# Patient Record
Sex: FEMALE | Race: WHITE | NOT HISPANIC OR LATINO | Employment: OTHER | URBAN - METROPOLITAN AREA
[De-identification: names, ages, dates, MRNs, and addresses within clinical notes are randomized per-mention and may not be internally consistent; named-entity substitution may affect disease eponyms.]

---

## 2017-01-17 ENCOUNTER — TELEPHONE (OUTPATIENT)
Dept: CARDIOLOGY | Facility: CLINIC | Age: 82
End: 2017-01-17

## 2017-01-17 NOTE — TELEPHONE ENCOUNTER
----- Message from Meeta Malin MA sent at 1/17/2017  2:28 PM CST -----  Contact: Pt's Daughter (Nan)  Pt's daughter called this pm.  She was calling in reference to an appt that was rescheduled by Mrs. Richardson to 2/14/17 w/ Dr. Curran.  She normally sees Dr. Mon and her daughter would rather her see Dr. Mon and sooner rather than later.  Mrs. Richardson is also having issues with her mood.  Since her  is very sick and receiving hospice care she does not want to leave him to go to her cardiac rehab and other appt's.   The daughters say she does not take care of herself at all.  They want to know if there is anyway she can see someone in psychiatry for those issues.  Mrs. Montana can be reached at [phone] and another  is Meeta at [phone].  Thanks

## 2017-01-26 ENCOUNTER — TELEPHONE (OUTPATIENT)
Dept: CARDIOLOGY | Facility: CLINIC | Age: 82
End: 2017-01-26

## 2017-01-26 NOTE — TELEPHONE ENCOUNTER
Per 02/17/17 note patient family will talk to doctor on 02/03/17 about rehab she do not want to attend.

## 2017-02-03 ENCOUNTER — OFFICE VISIT (OUTPATIENT)
Dept: CARDIOLOGY | Facility: CLINIC | Age: 82
End: 2017-02-03
Payer: MEDICARE

## 2017-02-03 VITALS
HEIGHT: 63 IN | DIASTOLIC BLOOD PRESSURE: 65 MMHG | BODY MASS INDEX: 33.01 KG/M2 | SYSTOLIC BLOOD PRESSURE: 145 MMHG | WEIGHT: 186.31 LBS | HEART RATE: 58 BPM

## 2017-02-03 DIAGNOSIS — E78.5 HYPERLIPIDEMIA, UNSPECIFIED HYPERLIPIDEMIA TYPE: ICD-10-CM

## 2017-02-03 DIAGNOSIS — I50.31 ACUTE DIASTOLIC HEART FAILURE: ICD-10-CM

## 2017-02-03 DIAGNOSIS — I25.10 CORONARY ARTERY DISEASE INVOLVING NATIVE CORONARY ARTERY OF NATIVE HEART WITHOUT ANGINA PECTORIS: ICD-10-CM

## 2017-02-03 DIAGNOSIS — I10 ESSENTIAL HYPERTENSION: ICD-10-CM

## 2017-02-03 DIAGNOSIS — I83.92 VARICOSE VEINS OF LEFT LOWER EXTREMITY: ICD-10-CM

## 2017-02-03 DIAGNOSIS — I21.4 NSTEMI (NON-ST ELEVATED MYOCARDIAL INFARCTION): ICD-10-CM

## 2017-02-03 DIAGNOSIS — I83.893 VARICOSE VEINS OF LEG WITH EDEMA, BILATERAL: ICD-10-CM

## 2017-02-03 DIAGNOSIS — I50.31 ACUTE DIASTOLIC CHF (CONGESTIVE HEART FAILURE), NYHA CLASS 2: Primary | ICD-10-CM

## 2017-02-03 PROCEDURE — 1157F ADVNC CARE PLAN IN RCRD: CPT | Mod: GC,S$GLB,, | Performed by: INTERNAL MEDICINE

## 2017-02-03 PROCEDURE — 1159F MED LIST DOCD IN RCRD: CPT | Mod: GC,S$GLB,, | Performed by: INTERNAL MEDICINE

## 2017-02-03 PROCEDURE — 99999 PR PBB SHADOW E&M-EST. PATIENT-LVL IV: CPT | Mod: PBBFAC,GC,, | Performed by: INTERNAL MEDICINE

## 2017-02-03 PROCEDURE — 1160F RVW MEDS BY RX/DR IN RCRD: CPT | Mod: GC,S$GLB,, | Performed by: INTERNAL MEDICINE

## 2017-02-03 PROCEDURE — 1126F AMNT PAIN NOTED NONE PRSNT: CPT | Mod: GC,S$GLB,, | Performed by: INTERNAL MEDICINE

## 2017-02-03 PROCEDURE — 99214 OFFICE O/P EST MOD 30 MIN: CPT | Mod: GC,S$GLB,, | Performed by: INTERNAL MEDICINE

## 2017-02-03 RX ORDER — FUROSEMIDE 20 MG/1
20 TABLET ORAL DAILY
Qty: 30 TABLET | Refills: 11 | Status: SHIPPED | OUTPATIENT
Start: 2017-02-03 | End: 2017-04-07

## 2017-02-03 NOTE — PROGRESS NOTES
Cardiology Clinic Note  Reason for Visit: NSTEMI      HPI:   89 y.o. female with h/o HTN, HLP, chronic venous insufficiency, MGUS, Hypothyroidism, Anxiety who present for f/u of NSTEMI s/p PCI with THONY to ostial PDA and POBA to PLB to prevent jailing from stent struts on 9/23/16. NL EF with diastolic dysfunction at that time Pt denies any recurrent pain to BL chest or back pain which brought her to hospital. She stopped cardiac rehab 2/2 fatigue from exercising in her treadmill. She does get SOB with exertion(walkign 2 blocks and sweeping). No orthopnea, PND or LE edema. Has gaiend 6lbs since Oct. No SOB or CURRY. SBP at home in 130's. No dizziness. No bleeding issues. She does have pain in BL LE from varicose veins. Has had EVLT in past and would like another opinion as opposed Vasc Sx rec    ROS:    Constitution: Negative for fever or chills. Negative for weight loss or gain.   HENT: Negative for sore throat or headaches. Negative for rhinorrhea.  Eyes: Negative for blurred or double vision.   Cardiovascular: See above  Pulmonary: Negative for SOB. Negative for cough.   Gastrointestinal: Negative for abdominal pain. Negative for nausea/ vomiting. Negative for diarrhea.   : Negative for dysuria.   Neurological: Negative for focal weakness or sensory changes.  PMH:     Past Medical History   Diagnosis Date    Anticoagulant long-term use     Anxiety     Arthritis     Chronic rhinitis     Coronary artery disease involving native coronary artery of native heart without angina pectoris 10/14/2016    Encounter for blood transfusion     Glucose intolerance (impaired glucose tolerance) 2/25/2013    Hyperlipidemia     Hypertension     Osteoporosis     Polyclonal gammopathy     Thyroid disease      Past Surgical History   Procedure Laterality Date    Appendectomy      Hysterectomy      Eye surgery      Tonsillectomy       Allergies:   Review of patient's allergies indicates:  No Known  Allergies  Medications:     Current Outpatient Prescriptions on File Prior to Visit   Medication Sig Dispense Refill    acetaminophen (TYLENOL) 325 MG tablet Take 325 mg by mouth every 6 (six) hours as needed.        alprazolam (XANAX) 0.25 MG tablet TAKE 1 TABLET(0.25 MG) BY MOUTH EVERY NIGHT AS NEEDED FOR ANXIETY 30 tablet 0    aspirin (ECOTRIN) 81 MG EC tablet Take 1 tablet (81 mg total) by mouth once daily.  0    atorvastatin (LIPITOR) 40 MG tablet Take 1 tablet (40 mg total) by mouth once daily. 90 tablet 3    cetirizine (ZYRTEC) 10 MG tablet Take 10 mg by mouth daily as needed.        clopidogrel (PLAVIX) 75 mg tablet Take 1 tablet (75 mg total) by mouth once daily. 30 tablet 11    cyanocobalamin (VITAMIN B-12) 1000 MCG tablet Take 100 mcg by mouth once daily.      inhalation device (AEROCHAMBER MV) Use as directed for inhalation. 1 Device 3    levothyroxine (SYNTHROID) 75 MCG tablet Take 1 tablet (75 mcg total) by mouth once daily. 90 tablet 3    lisinopril (PRINIVIL,ZESTRIL) 5 MG tablet Take 1 tablet (5 mg total) by mouth once daily. (Patient taking differently: Take 5 mg by mouth 2 (two) times daily. ) 30 tablet 11    metoprolol tartrate (LOPRESSOR) 25 MG tablet Take 1 tablet (25 mg total) by mouth 2 (two) times daily. 60 tablet 11    multivitamin (THERAGRAN) per tablet Take 1 tablet by mouth once daily.      NASACORT AQ 55 mcg nasal inhaler INHALE 1 TO 2 SPRAYS IN EACH NASAL EVERY DAY 16.5 g 12    nitroGLYCERIN (NITROSTAT) 0.4 MG SL tablet Place 1 tablet (0.4 mg total) under the tongue every 5 (five) minutes as needed for Chest pain. 30 tablet 3     No current facility-administered medications on file prior to visit.      Social History:     Social History   Substance Use Topics    Smoking status: Never Smoker    Smokeless tobacco: Never Used    Alcohol use No     Family History:     Family History   Problem Relation Age of Onset    Hypertension Mother     Hypertension Father      "Diabetes Father     Cancer Father     No Known Problems Daughter     No Known Problems Daughter     No Known Problems Daughter     Asthma Neg Hx     Emphysema Neg Hx      Physical Exam:     Visit Vitals    BP (!) 145/65 (BP Location: Left arm, Patient Position: Sitting, BP Method: Automatic)    Pulse (!) 58    Ht 5' 2.5" (1.588 m)    Wt 84.5 kg (186 lb 4.6 oz)    BMI 33.53 kg/m2        Constitutional: No acute distress, conversant  HEENT: Sclera anicteric, Pupils equal, round and reactive to light, extraocular motions intact, Oropharynx clear  Neck: No JVD,+HJR, no carotid bruits  Cardiovascular: regular rate and rhythm, no murmur, rubs or gallops, normal S1/S2  Pulmonary: Clear to auscultation bilaterally  Abdominal: Abdomen soft, nontender, nondistended, positive bowel sounds  Extremities: No lower extremity edema,   Pulses: 2+ BL Radial, 2+ BL carotid, 2+ BL DP, 2+ BL PT  Skin: No ecchymosis, erythema, or ulcers  Psych: Alert and oriented x 3, appropriate affect  Neuro: CNII-XII intact, no focal deficits    Labs:     Lab Results   Component Value Date     09/30/2016    K 4.5 09/30/2016     09/30/2016    CO2 22 (L) 09/30/2016    BUN 18 09/30/2016    CREATININE 0.8 09/30/2016    ANIONGAP 11 09/30/2016     Lab Results   Component Value Date    AST 37 09/25/2016    ALT 22 09/25/2016    ALKPHOS 59 09/25/2016    BILITOT 0.5 09/25/2016    BILIDIR 0.1 07/08/2014    ALBUMIN 3.1 (L) 09/25/2016     Lab Results   Component Value Date    CALCIUM 9.4 09/30/2016    MG 2.2 09/25/2016    PHOS 2.8 08/04/2010     Lab Results   Component Value Date     (H) 09/22/2016    BNP 53 05/06/2009    Lab Results   Component Value Date    WBC 8.23 11/01/2016    HGB 14.0 11/01/2016    HCT 42.0 11/01/2016     11/01/2016    GRAN 4.9 11/01/2016    GRAN 60.1 11/01/2016     Lab Results   Component Value Date    INR 1.2 09/22/2016     Lab Results   Component Value Date    CHOL 116 (L) 11/01/2016    HDL 43 " 11/01/2016    LDLCALC 55.4 (L) 11/01/2016    TRIG 88 11/01/2016     Lab Results   Component Value Date    HGBA1C 6.2 09/30/2014     Lab Results   Component Value Date    TSH 3.156 09/30/2014    K6JZTHN 7.6 03/09/2012          Imaging:       EF   Date Value Ref Range Status   09/23/2016 60 55 - 65        EKG: NSR  Assessment:     CAD s/p PCI with THONY to ostial PDA and POBA to PLB to prevent jailing from stent struts on 9/23/16  - Cont ASA, Plavix, Lipitor, lisinopril, and metoprolol  - BP and HR at goal  - EF normal 9/2016  - No Angina    Acute on Chronic Diastolic Heart Failure NYHA II  - BP at goal  - Will start Lasix 20mg QD to see if helps as does have +HJR but no JVD, orthopnea, or PND  - Check BNP and BMP in one week    Varicose Veins  - Hx of EVLT in past but would like 2/2 opinion as Vasx Sx informed her they would not rec any further procedures and pt still having significant pain despite compression stockings so will refer to Vasc Medicine.    HTN  - Cont above regemin  - Cr stable since starting ACE    HLD  - LDL at goal    Obesity  - Encouraged diet and exercise    RTC in 3 months    Signed:  Donnie Gardner MD  Cardiology Fellow  [phone]  2/3/2017 9:27 AM    Follow-up:   RTC in 3 months

## 2017-02-03 NOTE — MR AVS SNAPSHOT
[facility]  [address]  [facility] [address]  Phone: [phone]                  Elisa Richardson   2/3/2017 10:40 AM   Office Visit    Description:  Female : 1927   Provider:  Donnie Gardner MD   Department:  [facility]           Reason for Visit     Hypertension     Shortness of Breath           Diagnoses this Visit        Comments    Acute diastolic CHF (congestive heart failure), NYHA class 2    -  Primary     Varicose veins of left lower extremity         Hyperlipidemia, unspecified hyperlipidemia type         NSTEMI (non-ST elevated myocardial infarction)                To Do List           Goals (5 Years of Data)     None       These Medications        Disp Refills Start End    furosemide (LASIX) 20 MG tablet 30 tablet 11 2/3/2017 3/5/2017    Take 1 tablet (20 mg total) by mouth once daily. - Oral    [facility] [address] [place] [address] AT [facility] #: [phone]         [facility] On Call     [facility] On Call Nurse Care Line -  Assistance  Registered nurses in the [facility] On Call Center provide clinical advisement, health education, appointment booking, and other advisory services.  Call for this free service at [phone].             Medications           Message regarding Medications     Verify the changes and/or additions to your medication regime listed below are the same as discussed with your clinician today.  If any of these changes or additions are incorrect, please notify your healthcare provider.        START taking these NEW medications        Refills    furosemide (LASIX) 20 MG tablet 11    Sig: Take 1 tablet (20 mg total) by mouth once daily.    Class: Normal    Route: Oral           Verify that the below list of medications is an accurate representation of the medications you are currently taking.  If none reported, the list may be blank. If incorrect, please contact your  "healthcare provider. Carry this list with you in case of emergency.           Current Medications     acetaminophen (TYLENOL) 325 MG tablet Take 325 mg by mouth every 6 (six) hours as needed.      alprazolam (XANAX) 0.25 MG tablet TAKE 1 TABLET(0.25 MG) BY MOUTH EVERY NIGHT AS NEEDED FOR ANXIETY    aspirin (ECOTRIN) 81 MG EC tablet Take 1 tablet (81 mg total) by mouth once daily.    atorvastatin (LIPITOR) 40 MG tablet Take 1 tablet (40 mg total) by mouth once daily.    cetirizine (ZYRTEC) 10 MG tablet Take 10 mg by mouth daily as needed.      clopidogrel (PLAVIX) 75 mg tablet Take 1 tablet (75 mg total) by mouth once daily.    cyanocobalamin (VITAMIN B-12) 1000 MCG tablet Take 100 mcg by mouth once daily.    furosemide (LASIX) 20 MG tablet Take 1 tablet (20 mg total) by mouth once daily.    inhalation device (AEROCHAMBER MV) Use as directed for inhalation.    levothyroxine (SYNTHROID) 75 MCG tablet Take 1 tablet (75 mcg total) by mouth once daily.    lisinopril (PRINIVIL,ZESTRIL) 5 MG tablet Take 1 tablet (5 mg total) by mouth once daily.    metoprolol tartrate (LOPRESSOR) 25 MG tablet Take 1 tablet (25 mg total) by mouth 2 (two) times daily.    multivitamin (THERAGRAN) per tablet Take 1 tablet by mouth once daily.    NASACORT AQ 55 mcg nasal inhaler INHALE 1 TO 2 SPRAYS IN EACH NASAL EVERY DAY    nitroGLYCERIN (NITROSTAT) 0.4 MG SL tablet Place 1 tablet (0.4 mg total) under the tongue every 5 (five) minutes as needed for Chest pain.           Clinical Reference Information           Your Vitals Were     BP Pulse Height Weight BMI    145/65 (BP Location: Left arm, Patient Position: Sitting, BP Method: Automatic) 58 5' 2.5" (1.588 m) 84.5 kg (186 lb 4.6 oz) 33.53 kg/m2      Blood Pressure          Most Recent Value    Right Arm BP - Sitting  147/64    Left Arm BP - Sitting  145/65    BP  (!)  145/65      Allergies as of 2/3/2017     No Known Allergies      Immunizations Administered on Date of Encounter - 2/3/2017  "    None      Orders Placed During Today's Visit      Normal Orders This Visit    Ambulatory Referral to Vascular Medicine     Future Labs/Procedures Expected by Expires    Basic metabolic panel  2/3/2017 4/4/2018    Brain natriuretic peptide  2/3/2017 (Approximate) 2/3/2018      Language Assistance Services     ATTENTION: Language assistance services are available, free of charge. Please call [phone].      ATENCIÓN: Si habla español, tiene a gunter disposición servicios gratuitos de asistencia lingüística. Llame al [phone].     CHÚ Ý: N?u b?n nói Ti?ng Vi?t, có các d?ch v? h? tr? ngôn ng? mi?n phí dành cho b?n. G?i s? [phone].         [facility] complies with applicable Federal civil rights laws and does not discriminate on the basis of race, color, national origin, age, disability, or sex.

## 2017-02-13 RX ORDER — ALPRAZOLAM 0.25 MG/1
TABLET ORAL
Qty: 30 TABLET | Refills: 0 | Status: SHIPPED | OUTPATIENT
Start: 2017-02-13 | End: 2017-03-23 | Stop reason: SDUPTHER

## 2017-02-13 NOTE — TELEPHONE ENCOUNTER
----- Message from Esperanza Hernandez sent at 2/13/2017  2:41 PM CST -----  Contact: Self/[phone]  RX request - refill or new RX.  Is this a refill or new RX:  Refill  RX name and strength: alprazolam (XANAX) 0.25 MG tablet  Directions: TAKE 1 TABLET(0.25 MG) BY MOUTH EVERY NIGHT AS NEEDED FOR ANXIETY  Is this a 30 day or 90 day RX:    Pharmacy name and phone #: [facility] [id number]  [place], [place]     [phone]   Comments:  Please call and advise        Thank you

## 2017-02-21 RX ORDER — LISINOPRIL 5 MG/1
5 TABLET ORAL 2 TIMES DAILY
Qty: 180 TABLET | Refills: 3 | Status: SHIPPED | OUTPATIENT
Start: 2017-02-21 | End: 2018-03-05 | Stop reason: SDUPTHER

## 2017-03-23 ENCOUNTER — TELEPHONE (OUTPATIENT)
Dept: INTERNAL MEDICINE | Facility: CLINIC | Age: 82
End: 2017-03-23

## 2017-03-24 RX ORDER — ALPRAZOLAM 0.25 MG/1
TABLET ORAL
Qty: 30 TABLET | Refills: 0 | Status: SHIPPED | OUTPATIENT
Start: 2017-03-24 | End: 2017-06-12 | Stop reason: SDUPTHER

## 2017-04-03 ENCOUNTER — TELEPHONE (OUTPATIENT)
Dept: INTERNAL MEDICINE | Facility: CLINIC | Age: 82
End: 2017-04-03

## 2017-04-03 NOTE — TELEPHONE ENCOUNTER
[facility], the patient's  is being buried today, the patient has a h/o heart attack from last fall, /67, last week her systolic was 170, with a 'true' anxiety attack, she has Xanax ordered but takes it only at night because she is sensitive to it. C/o to her right scapula from last night, patient is asking for a new Cardiology Referral, did not want to see a 'fellow' again, [facility] concerned she is not sharing how much pain the CP is causing her or how often she is having it.  Dr Jalloh asked to speak to the [facility] and she did.

## 2017-04-03 NOTE — TELEPHONE ENCOUNTER
I spoke with granddaughter Consuelo.    Lots of stress-   last week  The patient had some right-sided back discomfort yesterday and took Motrin and symptoms alleviated.  No syncope or left-sided chest pain.  No jaw pain.  Declines appointment today    I recommended she go to the ER after her 's  if she has continued symptoms.  Otherwise, we will see her tomorrow.  We can arrange for cardiology assessment.

## 2017-04-03 NOTE — TELEPHONE ENCOUNTER
----- Message from Jeimy Morris sent at 4/3/2017  6:56 AM CDT -----  Contact: granddaughter-lindesy  Pt needs to see the dr today pt have chest pain , grand daughter said py do not want to talk to triage at all only wants to se dr ferguson please call [phone]

## 2017-04-04 ENCOUNTER — CLINICAL SUPPORT (OUTPATIENT)
Dept: CARDIOLOGY | Facility: CLINIC | Age: 82
End: 2017-04-04
Payer: MEDICARE

## 2017-04-04 ENCOUNTER — LAB VISIT (OUTPATIENT)
Dept: LAB | Facility: HOSPITAL | Age: 82
End: 2017-04-04
Attending: INTERNAL MEDICINE
Payer: MEDICARE

## 2017-04-04 ENCOUNTER — TELEPHONE (OUTPATIENT)
Dept: UROGYNECOLOGY | Facility: CLINIC | Age: 82
End: 2017-04-04

## 2017-04-04 ENCOUNTER — OFFICE VISIT (OUTPATIENT)
Dept: INTERNAL MEDICINE | Facility: CLINIC | Age: 82
End: 2017-04-04
Payer: MEDICARE

## 2017-04-04 VITALS — SYSTOLIC BLOOD PRESSURE: 145 MMHG | BODY MASS INDEX: 36 KG/M2 | DIASTOLIC BLOOD PRESSURE: 62 MMHG | WEIGHT: 200 LBS

## 2017-04-04 DIAGNOSIS — I77.9 BILATERAL CAROTID ARTERY DISEASE: ICD-10-CM

## 2017-04-04 DIAGNOSIS — E03.9 ACQUIRED HYPOTHYROIDISM: ICD-10-CM

## 2017-04-04 DIAGNOSIS — I83.11 VARICOSE VEINS OF BOTH LOWER EXTREMITIES WITH INFLAMMATION: ICD-10-CM

## 2017-04-04 DIAGNOSIS — F41.1 GENERALIZED ANXIETY DISORDER: ICD-10-CM

## 2017-04-04 DIAGNOSIS — E55.9 VITAMIN D DEFICIENCY DISEASE: ICD-10-CM

## 2017-04-04 DIAGNOSIS — N39.46 MIXED INCONTINENCE URGE AND STRESS: ICD-10-CM

## 2017-04-04 DIAGNOSIS — I83.893 VARICOSE VEINS OF BOTH LEGS WITH EDEMA: ICD-10-CM

## 2017-04-04 DIAGNOSIS — F43.21 GRIEF REACTION: ICD-10-CM

## 2017-04-04 DIAGNOSIS — I87.2 VENOUS INSUFFICIENCY (CHRONIC) (PERIPHERAL): ICD-10-CM

## 2017-04-04 DIAGNOSIS — D47.2 MGUS (MONOCLONAL GAMMOPATHY OF UNKNOWN SIGNIFICANCE): Chronic | ICD-10-CM

## 2017-04-04 DIAGNOSIS — R73.02 GLUCOSE INTOLERANCE (IMPAIRED GLUCOSE TOLERANCE): ICD-10-CM

## 2017-04-04 DIAGNOSIS — I70.209 ATHEROSCLEROSIS OF ARTERIES OF EXTREMITIES: ICD-10-CM

## 2017-04-04 DIAGNOSIS — E78.2 MIXED HYPERLIPIDEMIA: ICD-10-CM

## 2017-04-04 DIAGNOSIS — R07.89 ATYPICAL CHEST PAIN: Primary | ICD-10-CM

## 2017-04-04 DIAGNOSIS — I50.31 ACUTE DIASTOLIC HEART FAILURE: ICD-10-CM

## 2017-04-04 DIAGNOSIS — E04.1 THYROID NODULE: ICD-10-CM

## 2017-04-04 DIAGNOSIS — H91.93 BILATERAL HEARING LOSS, UNSPECIFIED HEARING LOSS TYPE: ICD-10-CM

## 2017-04-04 DIAGNOSIS — I25.10 CORONARY ARTERY DISEASE INVOLVING NATIVE CORONARY ARTERY OF NATIVE HEART WITHOUT ANGINA PECTORIS: ICD-10-CM

## 2017-04-04 DIAGNOSIS — I10 ESSENTIAL HYPERTENSION: ICD-10-CM

## 2017-04-04 DIAGNOSIS — I83.12 VARICOSE VEINS OF BOTH LOWER EXTREMITIES WITH INFLAMMATION: ICD-10-CM

## 2017-04-04 DIAGNOSIS — R79.89 LOW VITAMIN B12 LEVEL: ICD-10-CM

## 2017-04-04 LAB
25(OH)D3+25(OH)D2 SERPL-MCNC: 25 NG/ML
ALBUMIN SERPL BCP-MCNC: 3.6 G/DL
ALP SERPL-CCNC: 68 U/L
ALT SERPL W/O P-5'-P-CCNC: 33 U/L
ANION GAP SERPL CALC-SCNC: 12 MMOL/L
AST SERPL-CCNC: 53 U/L
BACTERIA #/AREA URNS AUTO: ABNORMAL /HPF
BASOPHILS # BLD AUTO: 0.08 K/UL
BASOPHILS NFR BLD: 1.1 %
BILIRUB SERPL-MCNC: 0.6 MG/DL
BILIRUB UR QL STRIP: NEGATIVE
BNP SERPL-MCNC: 140 PG/ML
BUN SERPL-MCNC: 20 MG/DL
CALCIUM SERPL-MCNC: 9.3 MG/DL
CHLORIDE SERPL-SCNC: 108 MMOL/L
CHOLEST/HDLC SERPL: 2.4 {RATIO}
CLARITY UR REFRACT.AUTO: ABNORMAL
CO2 SERPL-SCNC: 22 MMOL/L
COLOR UR AUTO: YELLOW
CREAT SERPL-MCNC: 0.8 MG/DL
DIFFERENTIAL METHOD: NORMAL
EOSINOPHIL # BLD AUTO: 0.3 K/UL
EOSINOPHIL NFR BLD: 3.8 %
ERYTHROCYTE [DISTWIDTH] IN BLOOD BY AUTOMATED COUNT: 14 %
EST. GFR  (AFRICAN AMERICAN): >60 ML/MIN/1.73 M^2
EST. GFR  (NON AFRICAN AMERICAN): >60 ML/MIN/1.73 M^2
GLUCOSE SERPL-MCNC: 96 MG/DL
GLUCOSE UR QL STRIP: NEGATIVE
HCT VFR BLD AUTO: 42.6 %
HDL/CHOLESTEROL RATIO: 41.3 %
HDLC SERPL-MCNC: 104 MG/DL
HDLC SERPL-MCNC: 43 MG/DL
HGB BLD-MCNC: 14.3 G/DL
HGB UR QL STRIP: ABNORMAL
INTERNAL CAROTID STENOSIS: NORMAL
KETONES UR QL STRIP: NEGATIVE
LDLC SERPL CALC-MCNC: 42.4 MG/DL
LEUKOCYTE ESTERASE UR QL STRIP: ABNORMAL
LYMPHOCYTES # BLD AUTO: 2.1 K/UL
LYMPHOCYTES NFR BLD: 29.7 %
MCH RBC QN AUTO: 30.8 PG
MCHC RBC AUTO-ENTMCNC: 33.6 %
MCV RBC AUTO: 92 FL
MICROSCOPIC COMMENT: ABNORMAL
MONOCYTES # BLD AUTO: 0.7 K/UL
MONOCYTES NFR BLD: 9.4 %
NEUTROPHILS # BLD AUTO: 4 K/UL
NEUTROPHILS NFR BLD: 55.7 %
NITRITE UR QL STRIP: POSITIVE
NONHDLC SERPL-MCNC: 61 MG/DL
PH UR STRIP: 5 [PH] (ref 5–8)
PLATELET # BLD AUTO: 187 K/UL
PMV BLD AUTO: 11.7 FL
POTASSIUM SERPL-SCNC: 4.5 MMOL/L
PROT SERPL-MCNC: 7.2 G/DL
PROT UR QL STRIP: NEGATIVE
RBC # BLD AUTO: 4.64 M/UL
RBC #/AREA URNS AUTO: 3 /HPF (ref 0–4)
SODIUM SERPL-SCNC: 142 MMOL/L
SP GR UR STRIP: 1.01 (ref 1–1.03)
SQUAMOUS #/AREA URNS AUTO: 4 /HPF
TRIGL SERPL-MCNC: 93 MG/DL
TSH SERPL DL<=0.005 MIU/L-ACNC: 1.5 UIU/ML
URN SPEC COLLECT METH UR: ABNORMAL
UROBILINOGEN UR STRIP-ACNC: NEGATIVE EU/DL
WBC # BLD AUTO: 7.15 K/UL
WBC #/AREA URNS AUTO: 50 /HPF (ref 0–5)
WBC CLUMPS UR QL AUTO: ABNORMAL

## 2017-04-04 PROCEDURE — 99499 UNLISTED E&M SERVICE: CPT | Mod: S$GLB,,, | Performed by: INTERNAL MEDICINE

## 2017-04-04 PROCEDURE — 93005 ELECTROCARDIOGRAM TRACING: CPT | Mod: S$GLB,,, | Performed by: INTERNAL MEDICINE

## 2017-04-04 PROCEDURE — 99999 PR PBB SHADOW E&M-EST. PATIENT-LVL III: CPT | Mod: PBBFAC,,, | Performed by: INTERNAL MEDICINE

## 2017-04-04 PROCEDURE — 93880 EXTRACRANIAL BILAT STUDY: CPT | Mod: S$GLB,,, | Performed by: INTERNAL MEDICINE

## 2017-04-04 PROCEDURE — 93010 ELECTROCARDIOGRAM REPORT: CPT | Mod: S$GLB,,, | Performed by: INTERNAL MEDICINE

## 2017-04-04 PROCEDURE — 1159F MED LIST DOCD IN RCRD: CPT | Mod: S$GLB,,, | Performed by: INTERNAL MEDICINE

## 2017-04-04 PROCEDURE — 1160F RVW MEDS BY RX/DR IN RCRD: CPT | Mod: S$GLB,,, | Performed by: INTERNAL MEDICINE

## 2017-04-04 PROCEDURE — 1157F ADVNC CARE PLAN IN RCRD: CPT | Mod: S$GLB,,, | Performed by: INTERNAL MEDICINE

## 2017-04-04 PROCEDURE — 99215 OFFICE O/P EST HI 40 MIN: CPT | Mod: S$GLB,,, | Performed by: INTERNAL MEDICINE

## 2017-04-04 NOTE — TELEPHONE ENCOUNTER
Spoke to pt and scheduled her for Dr. Harrison next available and informed her I'd send an appointment letter in the mail. Pt voiced understanding and call ended.

## 2017-04-04 NOTE — MR AVS SNAPSHOT
[facility]  [address]  [facility] [address]  Phone: [phone]  Fax: [phone]                  Elisa Richardson   2017 8:30 AM   Office Visit    Description:  Female : 1927   Provider:  Shantel Jalloh MD   Department:  [facility]           Reason for Visit     Chest Pain           Diagnoses this Visit        Comments    Atypical chest pain    -  Primary     Acquired hypothyroidism         Bilateral carotid artery disease         Coronary artery disease involving native coronary artery of native heart without angina pectoris         Essential hypertension         Glucose intolerance (impaired glucose tolerance)         Mixed hyperlipidemia         Thyroid nodule         Venous insufficiency (chronic) (peripheral)         Varicose veins of both legs with edema         Varicose veins of both lower extremities with inflammation         Vitamin D deficiency disease         Generalized anxiety disorder         MGUS (monoclonal gammopathy of unknown significance)         Acute diastolic heart failure         Atherosclerosis of arteries of extremities         Mixed incontinence urge and stress         Grief reaction         Low vitamin B12 level         Bilateral hearing loss, unspecified hearing loss type                To Do List           Future Appointments        Provider Department Dept Phone    2017 8:00 AM Shantel Jalloh MD [facility] [phone]      Goals (5 Years of Data)     None      [facility]     [facility] Nurse Care Line -  Assistance  Unless otherwise directed by your provider, please contact [facility], our nurse care line that is available for  assistance.     Registered nurses in the [facility] On Call Center provide: appointment scheduling, clinical advisement, health education, and other advisory services.  Call: [phone] (toll free)               Medications           Message  regarding Medications     Verify the changes and/or additions to your medication regime listed below are the same as discussed with your clinician today.  If any of these changes or additions are incorrect, please notify your healthcare provider.             Verify that the below list of medications is an accurate representation of the medications you are currently taking.  If none reported, the list may be blank. If incorrect, please contact your healthcare provider. Carry this list with you in case of emergency.           Current Medications     aspirin (ECOTRIN) 81 MG EC tablet Take 1 tablet (81 mg total) by mouth once daily.    atorvastatin (LIPITOR) 40 MG tablet Take 1 tablet (40 mg total) by mouth once daily.    cetirizine (ZYRTEC) 10 MG tablet Take 10 mg by mouth daily as needed.      clopidogrel (PLAVIX) 75 mg tablet Take 1 tablet (75 mg total) by mouth once daily.    cyanocobalamin (VITAMIN B-12) 1000 MCG tablet Take 100 mcg by mouth once daily.    levothyroxine (SYNTHROID) 75 MCG tablet Take 1 tablet (75 mcg total) by mouth once daily.    lisinopril (PRINIVIL,ZESTRIL) 5 MG tablet Take 1 tablet (5 mg total) by mouth 2 (two) times daily.    metoprolol tartrate (LOPRESSOR) 25 MG tablet Take 1 tablet (25 mg total) by mouth 2 (two) times daily.    multivitamin (THERAGRAN) per tablet Take 1 tablet by mouth once daily.    acetaminophen (TYLENOL) 325 MG tablet Take 325 mg by mouth every 6 (six) hours as needed.      alprazolam (XANAX) 0.25 MG tablet TAKE 1 TABLET(0.25 MG) BY MOUTH EVERY NIGHT AS NEEDED FOR ANXIETY    furosemide (LASIX) 20 MG tablet Take 1 tablet (20 mg total) by mouth once daily.    inhalation device (AEROCHAMBER MV) Use as directed for inhalation.    NASACORT AQ 55 mcg nasal inhaler INHALE 1 TO 2 SPRAYS IN EACH NASAL EVERY DAY    nitroGLYCERIN (NITROSTAT) 0.4 MG SL tablet Place 1 tablet (0.4 mg total) under the tongue every 5 (five) minutes as needed for Chest pain.           Clinical Reference  Information           Your Vitals Were     BP Weight BMI          145/62 90.7 kg (200 lb) 36 kg/m2        Blood Pressure          Most Recent Value    BP  (!)  145/62      Allergies as of 4/4/2017     No Known Allergies      Immunizations Administered on Date of Encounter - 4/4/2017     None      Orders Placed During Today's Visit      Normal Orders This Visit    Ambulatory consult to Audiology     Ambulatory consult to Cardiology     Ambulatory consult to Urogynecology     Ambulatory consult to Vascular Medicine     EKG 12-lead     Urinalysis     Urine culture     Future Labs/Procedures Expected by Expires    Brain natriuretic peptide  4/4/2017 4/4/2018    CBC auto differential  4/4/2017 4/4/2018    Comprehensive metabolic panel  4/4/2017 4/4/2018    Hemoglobin A1c  4/4/2017 4/4/2018    Lipid panel  4/4/2017 4/4/2018    Protein electrophoresis, serum  4/4/2017 4/4/2018    TSH  4/4/2017 4/4/2018    Vitamin B12  4/4/2017 4/4/2018    Vitamin D  4/4/2017 (Approximate) 4/4/2018      Language Assistance Services     ATTENTION: Language assistance services are available, free of charge. Please call [phone].      ATENCIÓN: Si habla español, tiene a gunter disposición servicios gratuitos de asistencia lingüística. Llame al [phone].     CHÚ Ý: N?u b?n nói Ti?ng Vi?t, có các d?ch v? h? tr? ngôn ng? mi?n phí dành cho b?n. G?i s? [phone].         [facility] complies with applicable Federal civil rights laws and does not discriminate on the basis of race, color, national origin, age, disability, or sex.

## 2017-04-04 NOTE — PROGRESS NOTES
Subjective:       Patient ID: Elisa Richardson is a 89 y.o. female.    Chief Complaint:  Atypical chest pain    See phone call from yesterday.  Daughter Radha and granddaughter here with her today.    Significant stress,  passed away recently.  Had some atypical chest discomfort over the weekend.  Called yesterday which was the day of his  describing some discomfort right upper back.  Despite the fact that she has an established relationship with Cardiology, she did not want to see or call Cardiology and only wanted to see me; she apparently wants to establish with a different cardiologist.    She says she has been having chest pain since she had her heart attack but never told anyone but she was taking cre of her hysband.    When she gets the pain she takes Motrin and Xanax and this helps.  Usually gets chest pain once daily at least. For some reason she does not take nitroglycerin but the one time she took it it helped.    Pain is in upper shoulder area.    She says she hurt her back turning her  in bed.    She really can't describe a pattern of chest pain.  She says she gets it while sitting. She gets SOB with walking.        Updated history: she had an NSTEMI s/p PCI with THONY to ostial PDA and POBA to PLB on 16. Normal EF with diastolic dysfunction at that time.   She saw Cardiology in February.  She stopped cardiac rehab due to fatigue from exercising in her treadmill.  She admits she does get SOB with exertion(walking 2 blocks and sweeping).  No orthopnea, PND or LE edema.  Has gained a few pounds since 2016.  SBP at home in 130 range usually.   No dizziness.   No bleeding issues. She does have pain in both LE from varicose veins.  Has had EVLT in past and would like another opinion as opposed to prior vascular surgery recommendations.    No syncope or left arm or jaw pain.  No LOC.  Chronic venous stasis with chronic edema.  Denies GERD.  Significant stress, no SI or  HI.    Patient Active Problem List   Diagnosis    Essential hypertension    Acquired hypothyroidism    MGUS (monoclonal gammopathy of unknown significance)    Chronic rhinitis    Glucose intolerance (impaired glucose tolerance)    Varicose veins of both lower extremities with inflammation    Venous insufficiency (chronic) (peripheral)    Atherosclerosis of arteries of extremities    Left knee DJD    Thyroid nodules: see u/s 12/14    Bilateral carotid artery disease: 20-39% bilateral 1/15    Vitamin D deficiency disease    NSTEMI (non-ST elevated myocardial infarction)    Anxiety disorder    Acute diastolic heart failure    Mixed hyperlipidemia    Coronary artery disease involving native coronary artery of native heart without angina pectoris       HPI  Review of Systems   Constitutional: Positive for fatigue. Negative for chills and fever.   HENT: Positive for postnasal drip. Negative for congestion and ear pain.    Eyes: Negative.    Respiratory: Positive for cough and shortness of breath. Negative for chest tightness and wheezing.         SOB with exertion   Cardiovascular: Positive for chest pain and leg swelling.        Atypical back pain   Gastrointestinal: Negative.    Musculoskeletal: Positive for arthralgias and back pain.   Psychiatric/Behavioral: Positive for dysphoric mood. The patient is nervous/anxious.        Objective:      Physical Exam   Constitutional: She is oriented to person, place, and time. She appears well-developed and well-nourished.   HENT:   Head: Normocephalic and atraumatic.   Right Ear: External ear normal.   Left Ear: External ear normal.   Mouth/Throat: Oropharynx is clear and moist.   Eyes: Conjunctivae are normal.   Neck: Normal range of motion. Neck supple. Thyromegaly present.   Cardiovascular: Normal rate, regular rhythm and normal heart sounds.    Pulmonary/Chest: No respiratory distress. She has no wheezes. She has no rales.   Abdominal: Soft. Bowel sounds are  normal.   Musculoskeletal: She exhibits no edema.   Varicose veins   Lymphadenopathy:     She has no cervical adenopathy.   Neurological: She is alert and oriented to person, place, and time.   Skin: Skin is warm and dry. No rash noted. No erythema.   Psychiatric:   Tearful but appropriate  No SI or HI       Assessment:       1. Atypical chest pain    2. Acquired hypothyroidism    3. Bilateral carotid artery disease: 20-39% bilateral 1/15    4. Coronary artery disease involving native coronary artery of native heart without angina pectoris    5. Essential hypertension    6. Glucose intolerance (impaired glucose tolerance)    7. Mixed hyperlipidemia    8. Thyroid nodules: see u/s 12/14    9. Venous insufficiency (chronic) (peripheral)    10. Varicose veins of both legs with edema    11. Varicose veins of both lower extremities with inflammation    12. Vitamin D deficiency disease        Plan:         Atypical chest pain  -     EKG 12-lead: Done in office.  NSR, no ischemia  -     Ambulatory consult to Cardiology    Acquired hypothyroidism  -     TSH; Future; Expected date: 4/4/17    Bilateral carotid artery disease: 20-39% bilateral 1/15: We'll monitor.  No alarm symptoms    Coronary artery disease involving native coronary artery of native heart without angina pectoris  -     Ambulatory consult to Cardiology    Essential hypertension  -     CBC auto differential; Future; Expected date: 4/4/17  -     Comprehensive metabolic panel; Future; Expected date: 4/4/17    Glucose intolerance (impaired glucose tolerance)  -     Hemoglobin A1c; Future; Expected date: 4/4/17    Mixed hyperlipidemia  -     Lipid panel; Future; Expected date: 4/4/17    Thyroid nodules: see u/s 12/14: No current symptoms    Venous insufficiency (chronic) (peripheral)    Varicose veins of both legs with edema    Varicose veins of both lower extremities with inflammation    Vitamin D deficiency disease  -     Vitamin D; Future; Expected date:  4/4/17    Generalized anxiety disorder    MGUS (monoclonal gammopathy of unknown significance)  -     Protein electrophoresis, serum; Future; Expected date: 4/4/17    Acute diastolic heart failure  -     Brain natriuretic peptide; Future; Expected date: 4/4/17    Atherosclerosis of arteries of extremities  -     Ambulatory consult to Vascular Medicine    Mixed incontinence urge and stress  -     Urinalysis  -     Urine culture  -     Ambulatory consult to Urogynecology    Grief reaction: Family involved.  Able to contract for safety.  No SI or HI    Low vitamin B12 level  -     Vitamin B12; Future; Expected date: 4/4/17    Bilateral hearing loss, unspecified hearing loss type  -     Ambulatory consult to Audiology    Other orders  -     Urinalysis Microscopic    I will review all studies and determine further tx depending on findings    Keep previously scheduled appt with me    Patient evaluated for over 40 minutes with this appoinment, including diagnostic testing and treatment.  All questions answered,  chart reviewed and care coordinated.    May want to consider outpatient case management and/or home health but at the present time, the patient's living independently and does not desire any home care.  Her 's hospice service is coming to give her some support as well.

## 2017-04-04 NOTE — TELEPHONE ENCOUNTER
----- Message from Neelam Li sent at 4/4/2017  9:58 AM CDT -----  Pt is referred by Dr. Jalloh. Please call pt to schedule at [phone].    Thank you

## 2017-04-05 ENCOUNTER — TELEPHONE (OUTPATIENT)
Dept: INTERNAL MEDICINE | Facility: CLINIC | Age: 82
End: 2017-04-05

## 2017-04-05 LAB
ALBUMIN SERPL ELPH-MCNC: 3.65 G/DL
ALPHA1 GLOB SERPL ELPH-MCNC: 0.3 G/DL
ALPHA2 GLOB SERPL ELPH-MCNC: 0.83 G/DL
B-GLOBULIN SERPL ELPH-MCNC: 0.9 G/DL
ESTIMATED AVG GLUCOSE: 137 MG/DL
GAMMA GLOB SERPL ELPH-MCNC: 1.21 G/DL
HBA1C MFR BLD HPLC: 6.4 %
PROT SERPL-MCNC: 6.9 G/DL

## 2017-04-05 RX ORDER — NITROFURANTOIN 25; 75 MG/1; MG/1
100 CAPSULE ORAL 2 TIMES DAILY
Qty: 10 CAPSULE | Refills: 0 | Status: SHIPPED | OUTPATIENT
Start: 2017-04-05 | End: 2017-04-18

## 2017-04-05 NOTE — TELEPHONE ENCOUNTER
Please let her know that labs are all acceptable.  Vitamin D was slightly low and she should take 2000 units of D over-the-counter    She has a urinary infection and I'm sending some antibiotics into the pharmacy for her and I will review the final results when completed.    If the urine issues continue to be problematic after the antibiotic treatment, please let me know

## 2017-04-05 NOTE — TELEPHONE ENCOUNTER
Spoke with pt and advised. Pt agreed to inform office if urine issues continue to be problematic.

## 2017-04-06 LAB — BACTERIA UR CULT: NORMAL

## 2017-04-07 ENCOUNTER — OFFICE VISIT (OUTPATIENT)
Dept: CARDIOLOGY | Facility: CLINIC | Age: 82
End: 2017-04-07
Payer: MEDICARE

## 2017-04-07 ENCOUNTER — CLINICAL SUPPORT (OUTPATIENT)
Dept: AUDIOLOGY | Facility: CLINIC | Age: 82
End: 2017-04-07
Payer: MEDICARE

## 2017-04-07 VITALS
BODY MASS INDEX: 32.23 KG/M2 | SYSTOLIC BLOOD PRESSURE: 146 MMHG | DIASTOLIC BLOOD PRESSURE: 64 MMHG | OXYGEN SATURATION: 96 % | HEIGHT: 63 IN | HEART RATE: 61 BPM | WEIGHT: 181.88 LBS

## 2017-04-07 DIAGNOSIS — I51.89 DIASTOLIC DYSFUNCTION: ICD-10-CM

## 2017-04-07 DIAGNOSIS — I77.9 BILATERAL CAROTID ARTERY DISEASE: ICD-10-CM

## 2017-04-07 DIAGNOSIS — I25.2 HISTORY OF NON-ST ELEVATION MYOCARDIAL INFARCTION (NSTEMI): Primary | ICD-10-CM

## 2017-04-07 DIAGNOSIS — I87.2 VENOUS INSUFFICIENCY (CHRONIC) (PERIPHERAL): ICD-10-CM

## 2017-04-07 DIAGNOSIS — E03.9 ACQUIRED HYPOTHYROIDISM: ICD-10-CM

## 2017-04-07 DIAGNOSIS — E78.5 HYPERLIPIDEMIA, UNSPECIFIED HYPERLIPIDEMIA TYPE: ICD-10-CM

## 2017-04-07 DIAGNOSIS — Z95.5 S/P DRUG ELUTING CORONARY STENT PLACEMENT: ICD-10-CM

## 2017-04-07 DIAGNOSIS — R07.9 CHEST PAIN OF UNKNOWN ETIOLOGY: ICD-10-CM

## 2017-04-07 DIAGNOSIS — I10 ESSENTIAL HYPERTENSION: ICD-10-CM

## 2017-04-07 PROCEDURE — 1157F ADVNC CARE PLAN IN RCRD: CPT | Mod: S$GLB,,, | Performed by: INTERNAL MEDICINE

## 2017-04-07 PROCEDURE — 1159F MED LIST DOCD IN RCRD: CPT | Mod: S$GLB,,, | Performed by: INTERNAL MEDICINE

## 2017-04-07 PROCEDURE — 1126F AMNT PAIN NOTED NONE PRSNT: CPT | Mod: S$GLB,,, | Performed by: INTERNAL MEDICINE

## 2017-04-07 PROCEDURE — 99999 PR PBB SHADOW E&M-EST. PATIENT-LVL I: CPT | Mod: PBBFAC,,,

## 2017-04-07 PROCEDURE — 99499 UNLISTED E&M SERVICE: CPT | Mod: S$GLB,,, | Performed by: INTERNAL MEDICINE

## 2017-04-07 PROCEDURE — 1160F RVW MEDS BY RX/DR IN RCRD: CPT | Mod: S$GLB,,, | Performed by: INTERNAL MEDICINE

## 2017-04-07 PROCEDURE — 99999 PR PBB SHADOW E&M-EST. PATIENT-LVL III: CPT | Mod: PBBFAC,,, | Performed by: INTERNAL MEDICINE

## 2017-04-07 PROCEDURE — 99213 OFFICE O/P EST LOW 20 MIN: CPT | Mod: S$GLB,,, | Performed by: INTERNAL MEDICINE

## 2017-04-07 PROCEDURE — 92557 COMPREHENSIVE HEARING TEST: CPT | Mod: S$GLB,,, | Performed by: AUDIOLOGIST

## 2017-04-07 RX ORDER — CHOLECALCIFEROL (VITAMIN D3) 25 MCG
185 TABLET ORAL DAILY
COMMUNITY

## 2017-04-07 NOTE — PROGRESS NOTES
Chart has been dictated using voice recognition software.  It is not been reviewed carefully for any transcriptional errors due to this technology.   Subjective:   Patient ID:  Elisa Richardson is a 89 y.o. female who presents for evaluation of Chest Pain (Discomfort ); Back Pain; Shortness of Breath (with exertion ); and Dizziness      HPI: Patient with obesity, atrial fibrillation, hypertension, hyperlipidemia, chronic venous insufficiency s/p endovascular laser therapy (EVLT) (22-Jan-2014), MGUS (monoclonal gammopathy of undetermined significance),  Hypothyroidism, Anxiety  NSTEMI s/p PCI with THONY to ostial PDA and POBA to PLB to prevent jailing from stent struts (23-Sep-2016), and diastolic dysfunction.     Carotid Doppler (04-Apr-2017)  There is 0 - 19% right Internal Carotid stenosis.  There is 0 - 19% left Internal Carotid stenosis.    Patient notes similar ischemic pain (pain across back and to chest) when she was turning , occasionally when she walks, and sometimes spontaneous.  Will also get other types of chest pain.  Notes CURRY after about 2 blocks.  No orthopnea or PND. Chronic edema not getting worse.  Patient denies any palpitations, lightheadedness, or syncope.       Past Medical History:   Diagnosis Date    Anticoagulant long-term use     Anxiety     Arthritis     Chronic rhinitis     Coronary artery disease involving native coronary artery of native heart without angina pectoris 10/14/2016    Encounter for blood transfusion     Glucose intolerance (impaired glucose tolerance) 2/25/2013    Hyperlipidemia     Hypertension     Osteoporosis     Polyclonal gammopathy     Thyroid disease        Past Surgical History:   Procedure Laterality Date    APPENDECTOMY      EYE SURGERY      HYSTERECTOMY      TONSILLECTOMY         Social History   Substance Use Topics    Smoking status: Never Smoker    Smokeless tobacco: Never Used    Alcohol use No       Outpatient Medications Prior to  Visit   Medication Sig Dispense Refill    acetaminophen (TYLENOL) 325 MG tablet Take 325 mg by mouth every 6 (six) hours as needed.        alprazolam (XANAX) 0.25 MG tablet TAKE 1 TABLET(0.25 MG) BY MOUTH EVERY NIGHT AS NEEDED FOR ANXIETY 30 tablet 0    aspirin (ECOTRIN) 81 MG EC tablet Take 1 tablet (81 mg total) by mouth once daily.  0    atorvastatin (LIPITOR) 40 MG tablet Take 1 tablet (40 mg total) by mouth once daily. 90 tablet 3    cetirizine (ZYRTEC) 10 MG tablet Take 10 mg by mouth daily as needed.        clopidogrel (PLAVIX) 75 mg tablet Take 1 tablet (75 mg total) by mouth once daily. 30 tablet 11    cyanocobalamin (VITAMIN B-12) 1000 MCG tablet Take 100 mcg by mouth once daily.      inhalation device (AEROCHAMBER MV) Use as directed for inhalation. 1 Device 3    levothyroxine (SYNTHROID) 75 MCG tablet Take 1 tablet (75 mcg total) by mouth once daily. 90 tablet 3    lisinopril (PRINIVIL,ZESTRIL) 5 MG tablet Take 1 tablet (5 mg total) by mouth 2 (two) times daily. 180 tablet 3    metoprolol tartrate (LOPRESSOR) 25 MG tablet Take 1 tablet (25 mg total) by mouth 2 (two) times daily. 60 tablet 11    multivitamin (THERAGRAN) per tablet Take 1 tablet by mouth once daily.      NASACORT AQ 55 mcg nasal inhaler INHALE 1 TO 2 SPRAYS IN EACH NASAL EVERY DAY 16.5 g 12    nitrofurantoin, macrocrystal-monohydrate, (MACROBID) 100 MG capsule Take 1 capsule (100 mg total) by mouth 2 (two) times daily. 10 capsule 0    nitroGLYCERIN (NITROSTAT) 0.4 MG SL tablet Place 1 tablet (0.4 mg total) under the tongue every 5 (five) minutes as needed for Chest pain. 30 tablet 3    furosemide (LASIX) 20 MG tablet Take 1 tablet (20 mg total) by mouth once daily. 30 tablet 11     No facility-administered medications prior to visit.        Review of patient's allergies indicates:  No Known Allergies    Review of Systems   Constitution: Negative for weight gain and weight loss.   HENT: Positive for nosebleeds (occasional,  "easily controlled).    Eyes: Negative for vision loss in left eye and vision loss in right eye.   Cardiovascular: Negative for claudication.        As above   Respiratory: Negative for hemoptysis, shortness of breath, snoring, sputum production and wheezing.    Endocrine: Negative for polydipsia and polyuria.   Hematologic/Lymphatic: Does not bruise/bleed easily.   Musculoskeletal: Negative for myalgias.   Gastrointestinal: Negative for change in bowel habit, hematemesis, hematochezia, melena, nausea and vomiting.   Genitourinary: Negative for hematuria.   Neurological: Negative for focal weakness and numbness.     Objective:   Physical Exam   Constitutional: She is oriented to person, place, and time. She appears well-developed and well-nourished.   BP (!) 146/64 (BP Location: Left arm, Patient Position: Sitting, BP Method: Automatic)  Pulse 61  Ht 5' 2.5" (1.588 m)  Wt 82.5 kg (181 lb 14.1 oz)  SpO2 96%  BMI 32.74 kg/m2   Neck: Neck supple. No JVD present. Carotid bruit is not present. No thyromegaly present.   Cardiovascular: Normal rate, regular rhythm, S1 normal, S2 normal and intact distal pulses.  Exam reveals S4. Exam reveals no friction rub.    No murmur heard.  Pulmonary/Chest: Breath sounds normal. She has no wheezes. She has no rales.   Abdominal: Soft. Bowel sounds are normal. There is no hepatosplenomegaly. There is no tenderness.   Musculoskeletal: She exhibits no edema.   Neurological: She is alert and oriented to person, place, and time. She has normal strength.   Skin: No cyanosis. Nails show no clubbing.       Lab Results   Component Value Date    WBC 7.15 04/04/2017    HGB 14.3 04/04/2017    HCT 42.6 04/04/2017    MCV 92 04/04/2017     04/04/2017       Lab Results   Component Value Date     04/04/2017    K 4.5 04/04/2017    BUN 20 04/04/2017    CREATININE 0.8 04/04/2017    GLU 96 04/04/2017    HGBA1C 6.4 (H) 04/04/2017    CHOL 104 (L) 04/04/2017    HDL 43 04/04/2017    LDLCALC " 42.4 (L) 04/04/2017    TRIG 93 04/04/2017    CHOLHDL 41.3 04/04/2017    HGB 14.3 04/04/2017    HCT 42.6 04/04/2017     04/04/2017    INR 1.2 09/22/2016     ECG (04-Apr-2017) showed sinus rhythm and was a normal ECG.   Assessment:     1. History of non-ST elevation myocardial infarction (NSTEMI)    2. S/P drug eluting coronary stent placement    3. Chest pain of unknown etiology    4. Hyperlipidemia, unspecified hyperlipidemia type    5. Essential hypertension    6. Venous insufficiency (chronic) (peripheral)    7. Bilateral carotid artery disease: 20-39% bilateral 1/15    8. Diastolic dysfunction    9. Acquired hypothyroidism      The patient has been having several different kinds of chest pains since her MI.  She does have pain that could be ischemic, such as when she was turning her late  in the bed.  She occasionally gets a similar pain when she is doing things such as sweeping.  She also has 2 block dyspnea on exertion without chest pain which may be related to diastolic dysfunction.  In order to determine whether she is having continuing ischemia, the patient will be sent for a dobutamine stress echo.  If the stress echo shows evidence for ischemia, the patient will be sent for repeat angiography, as there may be some restenosis of the side of her stents.  The patient will also be enrolled into the ADAPTABLE study.  Further decisions will be made upon review of the stress test.  The patient should return in 6 weeks for reevaluation.   Plan:     Elisa was seen today for chest pain, back pain, shortness of breath and dizziness.    Diagnoses and all orders for this visit:    History of non-ST elevation myocardial infarction (NSTEMI)  -     Pharmacological stress test w/ Color Jake; Future    S/P drug eluting coronary stent placement  -     Pharmacological stress test w/ Color Jake; Future    Chest pain of unknown etiology  -     Pharmacological stress test w/ Color Jake; Future    Hyperlipidemia,  unspecified hyperlipidemia type    Essential hypertension    Venous insufficiency (chronic) (peripheral)    Bilateral carotid artery disease: 20-39% bilateral 1/15    Diastolic dysfunction    Acquired hypothyroidism    Other orders  -     vitamin D 1000 units Tab; Take 185 mg by mouth once daily.

## 2017-04-07 NOTE — MR AVS SNAPSHOT
[facility] - Cardiology  [address]  [facility] [address]  Phone: [phone]                  Elisa Richardson   2017 9:30 AM   Office Visit    Description:  Female : 1927   Provider:  Mitul Corea MD   Department:  [facility]           Reason for Visit     Chest Pain     Back Pain     Shortness of Breath     Dizziness           Diagnoses this Visit        Comments    History of non-ST elevation myocardial infarction (NSTEMI)    -  Primary     S/P drug eluting coronary stent placement         Chest pain of unknown etiology         Hyperlipidemia, unspecified hyperlipidemia type         Essential hypertension         Venous insufficiency (chronic) (peripheral)         Bilateral carotid artery disease         Diastolic dysfunction         Acquired hypothyroidism                To Do List           Future Appointments        Provider Department Dept Phone    2017 11:00 AM AUDIOGRAM, AUDIO [facility] - Audiology [phone]    2017 2:20 PM Flori Ramos, AURORA [facility] [phone]    2017 4:00 PM Akin Lane MD [facility] - Vasc Diseases [phone]    2017 9:00 AM Rory Varela DO [facility] [phone]    2017 8:00 AM Shantel Jalloh MD [facility] - Internal Medicine [phone]      Goals (5 Years of Data)     None      Follow-Up and Disposition     Return in about 6 weeks (around 2017).      [facility]     [facility] Care [facility] -  Assistance  Unless otherwise directed by your provider, please contact [facility], our nurse care line that is available for  assistance.     Registered nurses in the [facility] provide: appointment scheduling, clinical advisement, health education, and other advisory services.  Call: [phone] (toll free)               Medications           Message regarding Medications     Verify the changes and/or additions to your  medication regime listed below are the same as discussed with your clinician today.  If any of these changes or additions are incorrect, please notify your healthcare provider.        STOP taking these medications     furosemide (LASIX) 20 MG tablet Take 1 tablet (20 mg total) by mouth once daily.           Verify that the below list of medications is an accurate representation of the medications you are currently taking.  If none reported, the list may be blank. If incorrect, please contact your healthcare provider. Carry this list with you in case of emergency.           Current Medications     acetaminophen (TYLENOL) 325 MG tablet Take 325 mg by mouth every 6 (six) hours as needed.      alprazolam (XANAX) 0.25 MG tablet TAKE 1 TABLET(0.25 MG) BY MOUTH EVERY NIGHT AS NEEDED FOR ANXIETY    aspirin (ECOTRIN) 81 MG EC tablet Take 1 tablet (81 mg total) by mouth once daily.    atorvastatin (LIPITOR) 40 MG tablet Take 1 tablet (40 mg total) by mouth once daily.    cetirizine (ZYRTEC) 10 MG tablet Take 10 mg by mouth daily as needed.      clopidogrel (PLAVIX) 75 mg tablet Take 1 tablet (75 mg total) by mouth once daily.    cyanocobalamin (VITAMIN B-12) 1000 MCG tablet Take 100 mcg by mouth once daily.    inhalation device (AEROCHAMBER MV) Use as directed for inhalation.    levothyroxine (SYNTHROID) 75 MCG tablet Take 1 tablet (75 mcg total) by mouth once daily.    lisinopril (PRINIVIL,ZESTRIL) 5 MG tablet Take 1 tablet (5 mg total) by mouth 2 (two) times daily.    metoprolol tartrate (LOPRESSOR) 25 MG tablet Take 1 tablet (25 mg total) by mouth 2 (two) times daily.    multivitamin (THERAGRAN) per tablet Take 1 tablet by mouth once daily.    NASACORT AQ 55 mcg nasal inhaler INHALE 1 TO 2 SPRAYS IN EACH NASAL EVERY DAY    nitrofurantoin, macrocrystal-monohydrate, (MACROBID) 100 MG capsule Take 1 capsule (100 mg total) by mouth 2 (two) times daily.    nitroGLYCERIN (NITROSTAT) 0.4 MG SL tablet Place 1 tablet (0.4 mg total)  "under the tongue every 5 (five) minutes as needed for Chest pain.    vitamin D 1000 units Tab Take 185 mg by mouth once daily.           Clinical Reference Information           Your Vitals Were     BP Pulse Height Weight SpO2 BMI    146/64 (BP Location: Left arm, Patient Position: Sitting, BP Method: Automatic) 61 5' 2.5" (1.588 m) 82.5 kg (181 lb 14.1 oz) 96% 32.74 kg/m2      Blood Pressure          Most Recent Value    Right Arm BP - Sitting  152/72    Left Arm BP - Sitting  146/64    BP  (!)  146/64      Allergies as of 4/7/2017     No Known Allergies      Immunizations Administered on Date of Encounter - 4/7/2017     None      Orders Placed During Today's Visit     Future Labs/Procedures Expected by Expires    Pharmacological stress test w/ Color Jake  As directed 4/7/2018      Language Assistance Services     ATTENTION: Language assistance services are available, free of charge. Please call [phone].      ATENCIÓN: Si habla español, tiene a gunter disposición servicios gratuitos de asistencia lingüística. Llame al [phone].     CHÚ Ý: N?u b?n nói Ti?ng Vi?t, có các d?ch v? h? tr? ngôn ng? mi?n phí dành cho b?n. G?i s? [phone].         [facility] complies with applicable Federal civil rights laws and does not discriminate on the basis of race, color, national origin, age, disability, or sex.        "

## 2017-04-07 NOTE — Clinical Note
Thank you for referring Elisa Richardson for evaluation of coronary artery disease and chest pain. Please see my note for details of this encounter. If you have any questions, please contact me.  Thank you again for the referral.

## 2017-04-10 ENCOUNTER — CLINICAL SUPPORT (OUTPATIENT)
Dept: AUDIOLOGY | Facility: CLINIC | Age: 82
End: 2017-04-10
Payer: MEDICARE

## 2017-04-10 PROCEDURE — 99499 UNLISTED E&M SERVICE: CPT | Mod: S$GLB,,, | Performed by: OTOLARYNGOLOGY

## 2017-04-10 NOTE — PROGRESS NOTES
Mrs. Richardson was seen for a hearing aid appointment.  I set her 's hearing aids to her hearing loss.  Care and maintenance instructions were given.    She will call if she needs adjustments.

## 2017-04-12 ENCOUNTER — OFFICE VISIT (OUTPATIENT)
Dept: OPTOMETRY | Facility: CLINIC | Age: 82
End: 2017-04-12
Payer: MEDICARE

## 2017-04-12 ENCOUNTER — INITIAL CONSULT (OUTPATIENT)
Dept: CARDIOLOGY | Facility: CLINIC | Age: 82
End: 2017-04-12
Payer: MEDICARE

## 2017-04-12 VITALS
DIASTOLIC BLOOD PRESSURE: 70 MMHG | SYSTOLIC BLOOD PRESSURE: 142 MMHG | BODY MASS INDEX: 33.43 KG/M2 | WEIGHT: 181.69 LBS | HEART RATE: 62 BPM | HEIGHT: 62 IN

## 2017-04-12 DIAGNOSIS — Z96.1 PSEUDOPHAKIA OF BOTH EYES: ICD-10-CM

## 2017-04-12 DIAGNOSIS — H01.02A SQUAMOUS BLEPHARITIS OF UPPER AND LOWER EYELIDS OF BOTH EYES: Primary | ICD-10-CM

## 2017-04-12 DIAGNOSIS — I87.2 VENOUS INSUFFICIENCY (CHRONIC) (PERIPHERAL): ICD-10-CM

## 2017-04-12 DIAGNOSIS — H52.13 MYOPIA WITH ASTIGMATISM AND PRESBYOPIA, BILATERAL: ICD-10-CM

## 2017-04-12 DIAGNOSIS — I83.11 VARICOSE VEINS OF BOTH LOWER EXTREMITIES WITH INFLAMMATION: Primary | ICD-10-CM

## 2017-04-12 DIAGNOSIS — H18.519 CORNEA GUTTATA: ICD-10-CM

## 2017-04-12 DIAGNOSIS — H52.4 MYOPIA WITH ASTIGMATISM AND PRESBYOPIA, BILATERAL: ICD-10-CM

## 2017-04-12 DIAGNOSIS — H01.02B SQUAMOUS BLEPHARITIS OF UPPER AND LOWER EYELIDS OF BOTH EYES: Primary | ICD-10-CM

## 2017-04-12 DIAGNOSIS — H52.203 MYOPIA WITH ASTIGMATISM AND PRESBYOPIA, BILATERAL: ICD-10-CM

## 2017-04-12 DIAGNOSIS — I83.12 VARICOSE VEINS OF BOTH LOWER EXTREMITIES WITH INFLAMMATION: Primary | ICD-10-CM

## 2017-04-12 PROCEDURE — 1159F MED LIST DOCD IN RCRD: CPT | Mod: S$GLB,,, | Performed by: INTERNAL MEDICINE

## 2017-04-12 PROCEDURE — 1157F ADVNC CARE PLAN IN RCRD: CPT | Mod: S$GLB,,, | Performed by: INTERNAL MEDICINE

## 2017-04-12 PROCEDURE — 99499 UNLISTED E&M SERVICE: CPT | Mod: S$GLB,,, | Performed by: INTERNAL MEDICINE

## 2017-04-12 PROCEDURE — 92015 DETERMINE REFRACTIVE STATE: CPT | Mod: S$GLB,,, | Performed by: OPTOMETRIST

## 2017-04-12 PROCEDURE — 99999 PR PBB SHADOW E&M-EST. PATIENT-LVL II: CPT | Mod: PBBFAC,,, | Performed by: OPTOMETRIST

## 2017-04-12 PROCEDURE — 1160F RVW MEDS BY RX/DR IN RCRD: CPT | Mod: S$GLB,,, | Performed by: INTERNAL MEDICINE

## 2017-04-12 PROCEDURE — 1126F AMNT PAIN NOTED NONE PRSNT: CPT | Mod: S$GLB,,, | Performed by: INTERNAL MEDICINE

## 2017-04-12 PROCEDURE — 99213 OFFICE O/P EST LOW 20 MIN: CPT | Mod: S$GLB,,, | Performed by: INTERNAL MEDICINE

## 2017-04-12 PROCEDURE — 92014 COMPRE OPH EXAM EST PT 1/>: CPT | Mod: S$GLB,,, | Performed by: OPTOMETRIST

## 2017-04-12 PROCEDURE — 99999 PR PBB SHADOW E&M-EST. PATIENT-LVL IV: CPT | Mod: PBBFAC,,, | Performed by: INTERNAL MEDICINE

## 2017-04-12 NOTE — LETTER
April 12, 2017      Shantel Jalloh MD  [address]  [facility] [address]           [facility]  [address]  [facility] [address]  Phone: [phone]          Patient: Elisa Richardson   MR Number: [id number]   YOB: 1927   Date of Visit: 4/12/2017       Dear Dr. Shantel Jalloh:    Thank you for referring Elisa Ricahrdson to me for evaluation. Attached you will find relevant portions of my assessment and plan of care.    If you have questions, please do not hesitate to call me. I look forward to following Elisa Richardson along with you.    Sincerely,    Akin Lane MD    Enclosure  CC:  No Recipients    If you would like to receive this communication electronically, please contact [email or web address] or [phone] to request more information on [employer] access.    For providers and/or their staff who would like to refer a patient to [facility], please contact us through our one-stop-shop provider referral line, [facility], at [phone].    If you feel you have received this communication in error or would no longer like to receive these types of communications, please e-mail [email or web address]

## 2017-04-12 NOTE — MR AVS SNAPSHOT
[facility]  [address]  [facility] [address]  Phone: [phone]                  Elisa Richardson   2017 4:00 PM   Initial consult    Description:  Female : 1927   Provider:  Akin Lane MD   Department:  [facility]           Reason for Visit     Consult           Diagnoses this Visit        Comments    Varicose veins of both lower extremities with inflammation    -  Primary     Venous insufficiency (chronic) (peripheral)                To Do List           Future Appointments        Provider Department Dept Phone    2017 4:00 PM MD [facility] [phone]    2017 3:00 PM DOBUTAMINE, ECHO [facility] Echo[facility] [phone]    2017 9:00 AM Rory Varela DO [facility] [phone]    2017 10:00 AM Mitul Corea MD [facility] Cardiology [phone]    2017 8:00 AM Shantel Jalloh MD [facility] Internal [facility] [phone]      Goals (5 Years of Data)     None      Follow-Up and Disposition     Call patient with results      [facility]     [facility] -  Assistance  Unless otherwise directed by your provider, please contact [facility], our nurse care line that is available for  assistance.     Registered nurses in the [facility] provide: appointment scheduling, clinical advisement, health education, and other advisory services.  Call: [phone] (toll free)               Medications           Message regarding Medications     Verify the changes and/or additions to your medication regime listed below are the same as discussed with your clinician today.  If any of these changes or additions are incorrect, please notify your healthcare provider.             Verify that the below list of medications is an accurate representation of the medications you are currently taking.  If none reported, the list  "may be blank. If incorrect, please contact your healthcare provider. Carry this list with you in case of emergency.           Current Medications     acetaminophen (TYLENOL) 325 MG tablet Take 325 mg by mouth every 6 (six) hours as needed.      alprazolam (XANAX) 0.25 MG tablet TAKE 1 TABLET(0.25 MG) BY MOUTH EVERY NIGHT AS NEEDED FOR ANXIETY    aspirin (ECOTRIN) 81 MG EC tablet Take 1 tablet (81 mg total) by mouth once daily.    atorvastatin (LIPITOR) 40 MG tablet Take 1 tablet (40 mg total) by mouth once daily.    cetirizine (ZYRTEC) 10 MG tablet Take 10 mg by mouth daily as needed.      clopidogrel (PLAVIX) 75 mg tablet Take 1 tablet (75 mg total) by mouth once daily.    cyanocobalamin (VITAMIN B-12) 1000 MCG tablet Take 100 mcg by mouth once daily.    inhalation device (AEROCHAMBER MV) Use as directed for inhalation.    levothyroxine (SYNTHROID) 75 MCG tablet Take 1 tablet (75 mcg total) by mouth once daily.    lisinopril (PRINIVIL,ZESTRIL) 5 MG tablet Take 1 tablet (5 mg total) by mouth 2 (two) times daily.    metoprolol tartrate (LOPRESSOR) 25 MG tablet Take 1 tablet (25 mg total) by mouth 2 (two) times daily.    multivitamin (THERAGRAN) per tablet Take 1 tablet by mouth once daily.    NASACORT AQ 55 mcg nasal inhaler INHALE 1 TO 2 SPRAYS IN EACH NASAL EVERY DAY    nitrofurantoin, macrocrystal-monohydrate, (MACROBID) 100 MG capsule Take 1 capsule (100 mg total) by mouth 2 (two) times daily.    nitroGLYCERIN (NITROSTAT) 0.4 MG SL tablet Place 1 tablet (0.4 mg total) under the tongue every 5 (five) minutes as needed for Chest pain.    vitamin D 1000 units Tab Take 185 mg by mouth once daily.           Clinical Reference Information           Your Vitals Were     BP Pulse Height Weight BMI    142/70 (BP Location: Left arm, Patient Position: Sitting, BP Method: Manual) 62 5' 2" (1.575 m) 82.4 kg (181 lb 10.5 oz) 33.23 kg/m2      Blood Pressure          Most Recent Value    BP  (!)  142/70      Allergies as of " 4/12/2017     No Known Allergies      Immunizations Administered on Date of Encounter - 4/12/2017     None      Language Assistance Services     ATTENTION: Language assistance services are available, free of charge. Please call [phone].      ATENCIÓN: Si chino nunez, tiene a gunter disposición servicios gratuitos de asistencia lingüística. Llame al [phone].     CHÚ Ý: N?u b?n nói Ti?ng Vi?t, có các d?ch v? h? tr? ngôn ng? mi?n phí dành cho b?n. G?i s? [phone].         [facility] complies with applicable Federal civil rights laws and does not discriminate on the basis of race, color, national origin, age, disability, or sex.

## 2017-04-12 NOTE — PROGRESS NOTES
Subjective:    Patient ID:  Elisa Richardson is a 89 y.o. Y.o.female who presents for evaluation of leg edema.      HPI: 89 year old female with obesity, atrial fibrillation, hypertension, hyperlipidemia, chronic venous insufficiency s/p endovascular laser therapy (EVLT) in both LE, MGUS, NSTEMI s/p PCI, and diastolic dysfunction. Presents today to evaluate her left leg swelling and heaviness. She is concerned that it may cause DVT and PE. She would like to see if she can have a procedure fr her left leg. No ulcer, wound, or skin discoloration.    6/2016.  Right Leg:  Color flow evaluation of the lower extremity demonstrates fully compressible and patent veins. There is no evidence of venous thrombosis in the deep or superficial veins. The GSV is not visualized due to previous EVLT procedure. There is   significant reflux in the Accessory (3.6 mm), GSV below the knee and LSV.    Left Leg:  Color flow evaluation of the lower extremity demonstrates fully compressible and patent veins. There is no evidence of venous thrombosis in the deep or superficial veins. The Accessory vein and GSV are not visualized due to previous EVLT   procedures. There is significant reflux in the LSV.    Past Medical History:   Diagnosis Date    Anticoagulant long-term use     Anxiety     Arthritis     Chronic rhinitis     Coronary artery disease involving native coronary artery of native heart without angina pectoris 10/14/2016    Encounter for blood transfusion     Glucose intolerance (impaired glucose tolerance) 2/25/2013    Hyperlipidemia     Hypertension     Osteoporosis     Polyclonal gammopathy     Thyroid disease        indicated that the status of her mother is unknown. She indicated that the status of her father is unknown. She indicated that all of her three daughters are alive. She indicated that the status of her neg hx is unknown.     Social History     Social History    Marital status:      Spouse name: N/A     Number of children: N/A    Years of education: N/A     Occupational History    Not on file.     Social History Main Topics    Smoking status: Never Smoker    Smokeless tobacco: Never Used    Alcohol use No    Drug use: No    Sexual activity: Not on file     Other Topics Concern    Not on file     Social History Narrative       Current Outpatient Prescriptions   Medication Sig    acetaminophen (TYLENOL) 325 MG tablet Take 325 mg by mouth every 6 (six) hours as needed.      alprazolam (XANAX) 0.25 MG tablet TAKE 1 TABLET(0.25 MG) BY MOUTH EVERY NIGHT AS NEEDED FOR ANXIETY    aspirin (ECOTRIN) 81 MG EC tablet Take 1 tablet (81 mg total) by mouth once daily.    atorvastatin (LIPITOR) 40 MG tablet Take 1 tablet (40 mg total) by mouth once daily.    cetirizine (ZYRTEC) 10 MG tablet Take 10 mg by mouth daily as needed.      clopidogrel (PLAVIX) 75 mg tablet Take 1 tablet (75 mg total) by mouth once daily.    cyanocobalamin (VITAMIN B-12) 1000 MCG tablet Take 100 mcg by mouth once daily.    inhalation device (AEROCHAMBER MV) Use as directed for inhalation.    levothyroxine (SYNTHROID) 75 MCG tablet Take 1 tablet (75 mcg total) by mouth once daily.    lisinopril (PRINIVIL,ZESTRIL) 5 MG tablet Take 1 tablet (5 mg total) by mouth 2 (two) times daily.    metoprolol tartrate (LOPRESSOR) 25 MG tablet Take 1 tablet (25 mg total) by mouth 2 (two) times daily.    multivitamin (THERAGRAN) per tablet Take 1 tablet by mouth once daily.    NASACORT AQ 55 mcg nasal inhaler INHALE 1 TO 2 SPRAYS IN EACH NASAL EVERY DAY    nitrofurantoin, macrocrystal-monohydrate, (MACROBID) 100 MG capsule Take 1 capsule (100 mg total) by mouth 2 (two) times daily.    nitroGLYCERIN (NITROSTAT) 0.4 MG SL tablet Place 1 tablet (0.4 mg total) under the tongue every 5 (five) minutes as needed for Chest pain.    vitamin D 1000 units Tab Take 185 mg by mouth once daily.     No current facility-administered medications for this visit.         CMP  Sodium   Date Value Ref Range Status   04/04/2017 142 136 - 145 mmol/L Final     Potassium   Date Value Ref Range Status   04/04/2017 4.5 3.5 - 5.1 mmol/L Final     Chloride   Date Value Ref Range Status   04/04/2017 108 95 - 110 mmol/L Final     CO2   Date Value Ref Range Status   04/04/2017 22 (L) 23 - 29 mmol/L Final     Glucose   Date Value Ref Range Status   04/04/2017 96 70 - 110 mg/dL Final     BUN, Bld   Date Value Ref Range Status   04/04/2017 20 8 - 23 mg/dL Final     Creatinine   Date Value Ref Range Status   04/04/2017 0.8 0.5 - 1.4 mg/dL Final     Calcium   Date Value Ref Range Status   04/04/2017 9.3 8.7 - 10.5 mg/dL Final     Total Protein   Date Value Ref Range Status   04/04/2017 7.2 6.0 - 8.4 g/dL Final     Albumin   Date Value Ref Range Status   04/04/2017 3.6 3.5 - 5.2 g/dL Final     Total Bilirubin   Date Value Ref Range Status   04/04/2017 0.6 0.1 - 1.0 mg/dL Final     Comment:     For infants and newborns, interpretation of results should be based  on gestational age, weight and in agreement with clinical  observations.  Premature Infant recommended reference ranges:  Up to 24 hours.............<8.0 mg/dL  Up to 48 hours............<12.0 mg/dL  3-5 days..................<15.0 mg/dL  6-29 days.................<15.0 mg/dL       Alkaline Phosphatase   Date Value Ref Range Status   04/04/2017 68 55 - 135 U/L Final     AST   Date Value Ref Range Status   04/04/2017 53 (H) 10 - 40 U/L Final     ALT   Date Value Ref Range Status   04/04/2017 33 10 - 44 U/L Final     Anion Gap   Date Value Ref Range Status   04/04/2017 12 8 - 16 mmol/L Final     eGFR if    Date Value Ref Range Status   04/04/2017 >60.0 >60 mL/min/1.73 m^2 Final     eGFR if non    Date Value Ref Range Status   04/04/2017 >60.0 >60 mL/min/1.73 m^2 Final     Comment:     Calculation used to obtain the estimated glomerular filtration  rate (eGFR) is the CKD-EPI equation. Since race is unknown  "  in our information system, the eGFR values for   -American and Non--American patients are given   for each creatinine result.         Lab Results   Component Value Date    WBC 7.15 04/04/2017    HGB 14.3 04/04/2017    HCT 42.6 04/04/2017    MCV 92 04/04/2017     04/04/2017       Review of Systems   Constitution: Negative for decreased appetite, fever and weight gain.   HENT: Negative.    Cardiovascular: Positive for leg swelling. Negative for chest pain, claudication and cyanosis.   Respiratory: Negative for cough, shortness of breath and wheezing.    Skin: Negative for color change, dry skin, itching, rash and suspicious lesions.   Musculoskeletal: Negative for arthritis, back pain, joint swelling and muscle weakness.   Gastrointestinal: Negative.    Genitourinary: Negative.    Neurological: Negative.  Negative for loss of balance, numbness and paresthesias.        Objective:   BP (!) 142/70 (BP Location: Left arm, Patient Position: Sitting, BP Method: Manual)  Pulse 62  Ht 5' 2" (1.575 m)  Wt 82.4 kg (181 lb 10.5 oz)  BMI 33.23 kg/m2  Physical Exam   Constitutional: She is oriented to person, place, and time. She appears well-developed and well-nourished. No distress.   HENT:   Head: Normocephalic and atraumatic.   Eyes: Conjunctivae and EOM are normal. Pupils are equal, round, and reactive to light.   Neck: Normal range of motion. Neck supple. No JVD present. No tracheal deviation present.   Cardiovascular: Normal rate, regular rhythm, normal heart sounds and intact distal pulses.  Exam reveals no gallop and no friction rub.    No murmur heard.  Pulmonary/Chest: Effort normal and breath sounds normal. No respiratory distress. She has no wheezes.   Abdominal: Soft. Bowel sounds are normal. She exhibits no distension. There is no tenderness. There is no rebound.   Musculoskeletal: Normal range of motion. She exhibits no edema or tenderness.   Neurological: She is alert and oriented to " person, place, and time.   Skin: Skin is warm. No rash noted. She is not diaphoretic. No erythema. No pallor.           Assessment:       1. Varicose veins of both lower extremities with inflammation     2. Venous insufficiency (chronic) (peripheral)          Plan:       Elisa was seen today for consult.      1. Continue Compression stocking 20-30 mmHg.  2. Tylenol of break through pain.  3. Sclerotherapy in event of venous bleeding.    Akin Lane

## 2017-04-12 NOTE — PATIENT INSTRUCTIONS
BLEPHARITIS    One of the most common eye problems is a skin condition called blepharitis, also known as granulated eyelids. Symptoms of blepharitis include redness, irritation, and scaly skin at the edges of the eyelids. The scales may be dry or greasy. Blurred vision and dry eyes are common because of its affect on the quality of the tear film. Eyelashes may fall out as well.     The cause of blepharitis is not known, but it is more common in people who have dandruff, skin allergies, or eczema, and in those who often have styes. The problem is often chronic.    To treat blepharitis at home, start with a warm compress on your eyes for 10 minutes. A Gel Pack works very well for retaining heat. A wet wash cloth heated in a microwave also works well.  Soak each eye for 10 minutes.  Then press against the edge of each eyelid to express trapped oils.  Close both eyes and gently wash all the oils and debris away with a very wet cloth.  Some doctors advise people to use a bit of baby shampoo to wash the eyelid edges: to do this, bring the shampoo to a froth and then gently wash the edges of the eye lid with the eyes close. Rinse thoroughly before opening the eyes.      Although lid hygiene is the most important part of the treatment for blepharitis, an antibiotic ointment is added to the treatment for more severe cases. Dab the ointment to the edges of the eyelids. Do not apply the ointment directly from the tube.      Recent research indicates that taking the dietary supplement  of  over-the-counter flaxseed oil capsules makes for considerable improvement of this condition. These can be purchased at many [employer] and nutritional counters.  Take these in the same way as a vitamin supplement - by mouth with a full glass of water.  Several of us who work here use this and have experienced considerable improvement.      Oral antibiotics such as tetracycline or doxycycline are often required for hard to treat cases such as  those associated rosacea.  Because any medicine by mouth can cause unintended, sometimes unpleasant, effects we don't use this unless regular hygiene is ineffective.    Blepharitis tends to be a chronic condition, so it is necessary to maintain a regular treatment program.    Flori Ramos, [facility]

## 2017-04-12 NOTE — PROGRESS NOTES
HPI     Ms. Elisa Richardson is her for comprehensive eye exam     Patient states she lost her glasses about 4-6 months ago (used mostly for   reading). She requests refraction and new SRx today. No other   visual/ocular concerns.    (-)drops  (-)flashes  (-)floaters  (-)diplopia    Diabetic no  Hemoglobin A1C       Date                     Value               Ref Range             Status                04/04/2017               6.4 (H)             4.5 - 6.2 %           Final                 09/30/2014               6.2                 4.5 - 6.2 %           Final                 03/14/2014               6.3 (H)             4.5 - 6.2 %           Final            ----------    OCULAR HISTORY  Last Eye Exam 07/15/15 Dr. Flores   (+)eye surgery: cataract surgery OU 2005    Dry eyes    FAMILY HISTORY  (-)Glaucoma none          Last edited by Flori Ramos, OD on 4/12/2017  2:48 PM.         Assessment /Plan     For exam results, see Encounter Report.    Squamous blepharitis of upper and lower eyelids of both eyes   Recommended warm compresses BID OU.     Myopia with astigmatism and presbyopia, bilateral   New glasses prescription released, adaptation expected.    Eyeglass Final Rx     Eyeglass Final Rx      Sphere Cylinder Axis Add   Right -0.25 +0.75 160 +2.50   Left -1.50 +1.50 180 +2.50       Expiration Date:  4/13/2018            Pseudophakia of both eyes   Doing well OU. Monitor.     Cornea guttata   Bilateral. Mild, not previously noted. Not visually significant OU. Monitor.      RTC 1 year

## 2017-04-12 NOTE — MR AVS SNAPSHOT
[facility]  [address]  [facility] [address]  Phone: [phone]                  Elisa Richardson   2017 2:20 PM   Office Visit    Description:  Female : 1927   Provider:  Flori Ramos OD   Department:  [facility]           Reason for Visit     Eye Exam                To Do List           Future Appointments        Provider Department Dept Phone    2017 4:00 PM MD [facility] [phone]    2017 3:00 PM DOBUTAMINE, ECHO [facility] Echo/Stress Lab [phone]    2017 9:00 AM Rory Varela DO [facility] [phone]    2017 10:00 AM Mitul Corea MD [facility] Cardiology [phone]    2017 8:00 AM Shantel Jalloh MD [facility] [phone]      Goals (5 Years of Data)     None      Follow-Up and Disposition     Return in about 1 year (around 2018) for annual eye examination.      [facility]     [facility] Care [facility] -  Assistance  Unless otherwise directed by your provider, please contact [facility], our nurse care line that is available for  assistance.     Registered nurses in the [facility] provide: appointment scheduling, clinical advisement, health education, and other advisory services.  Call: [phone] (toll free)               Medications           Message regarding Medications     Verify the changes and/or additions to your medication regime listed below are the same as discussed with your clinician today.  If any of these changes or additions are incorrect, please notify your healthcare provider.             Verify that the below list of medications is an accurate representation of the medications you are currently taking.  If none reported, the list may be blank. If incorrect, please contact your healthcare provider. Carry this list with you in case of emergency.            Current Medications     acetaminophen (TYLENOL) 325 MG tablet Take 325 mg by mouth every 6 (six) hours as needed.      alprazolam (XANAX) 0.25 MG tablet TAKE 1 TABLET(0.25 MG) BY MOUTH EVERY NIGHT AS NEEDED FOR ANXIETY    aspirin (ECOTRIN) 81 MG EC tablet Take 1 tablet (81 mg total) by mouth once daily.    atorvastatin (LIPITOR) 40 MG tablet Take 1 tablet (40 mg total) by mouth once daily.    cetirizine (ZYRTEC) 10 MG tablet Take 10 mg by mouth daily as needed.      clopidogrel (PLAVIX) 75 mg tablet Take 1 tablet (75 mg total) by mouth once daily.    cyanocobalamin (VITAMIN B-12) 1000 MCG tablet Take 100 mcg by mouth once daily.    inhalation device (AEROCHAMBER MV) Use as directed for inhalation.    levothyroxine (SYNTHROID) 75 MCG tablet Take 1 tablet (75 mcg total) by mouth once daily.    lisinopril (PRINIVIL,ZESTRIL) 5 MG tablet Take 1 tablet (5 mg total) by mouth 2 (two) times daily.    metoprolol tartrate (LOPRESSOR) 25 MG tablet Take 1 tablet (25 mg total) by mouth 2 (two) times daily.    multivitamin (THERAGRAN) per tablet Take 1 tablet by mouth once daily.    NASACORT AQ 55 mcg nasal inhaler INHALE 1 TO 2 SPRAYS IN EACH NASAL EVERY DAY    nitrofurantoin, macrocrystal-monohydrate, (MACROBID) 100 MG capsule Take 1 capsule (100 mg total) by mouth 2 (two) times daily.    nitroGLYCERIN (NITROSTAT) 0.4 MG SL tablet Place 1 tablet (0.4 mg total) under the tongue every 5 (five) minutes as needed for Chest pain.    vitamin D 1000 units Tab Take 185 mg by mouth once daily.           Clinical Reference Information           Allergies as of 4/12/2017     No Known Allergies      Immunizations Administered on Date of Encounter - 4/12/2017     None      Instructions    BLEPHARITIS    One of the most common eye problems is a skin condition called blepharitis, also known as granulated eyelids. Symptoms of blepharitis include redness, irritation, and scaly skin at the edges of the eyelids. The scales may be dry or greasy.  Blurred vision and dry eyes are common because of its affect on the quality of the tear film. Eyelashes may fall out as well.     The cause of blepharitis is not known, but it is more common in people who have dandruff, skin allergies, or eczema, and in those who often have styes. The problem is often chronic.    To treat blepharitis at home, start with a warm compress on your eyes for 10 minutes. A Gel Pack works very well for retaining heat. A wet wash cloth heated in a microwave also works well.  Soak each eye for 10 minutes.  Then press against the edge of each eyelid to express trapped oils.  Close both eyes and gently wash all the oils and debris away with a very wet cloth.  Some doctors advise people to use a bit of baby shampoo to wash the eyelid edges: to do this, bring the shampoo to a froth and then gently wash the edges of the eye lid with the eyes close. Rinse thoroughly before opening the eyes.      Although lid hygiene is the most important part of the treatment for blepharitis, an antibiotic ointment is added to the treatment for more severe cases. Dab the ointment to the edges of the eyelids. Do not apply the ointment directly from the tube.      Recent research indicates that taking the dietary supplement  of  over-the-counter flaxseed oil capsules makes for considerable improvement of this condition. These can be purchased at many [employer] and nutritional counters.  Take these in the same way as a vitamin supplement - by mouth with a full glass of water.  Several of us who work here use this and have experienced considerable improvement.      Oral antibiotics such as tetracycline or doxycycline are often required for hard to treat cases such as those associated rosacea.  Because any medicine by mouth can cause unintended, sometimes unpleasant, effects we don't use this unless regular hygiene is ineffective.    Blepharitis tends to be a chronic condition, so it is necessary to maintain a regular  treatment program.    Flori Ramos OD        Language Assistance Services     ATTENTION: Language assistance services are available, free of charge. Please call [phone].      ATENCIÓN: Si habbri nunez, tiene a gunter disposición servicios gratuitos de asistencia lingüística. Llame al [phone].     CHÚ Ý: N?u b?n nói Ti?ng Vi?t, có các d?ch v? h? tr? ngôn ng? mi?n phí dành cho b?n. G?i s? [phone].         [facility] complies with applicable Federal civil rights laws and does not discriminate on the basis of race, color, national origin, age, disability, or sex.

## 2017-04-17 ENCOUNTER — HOSPITAL ENCOUNTER (OUTPATIENT)
Dept: CARDIOLOGY | Facility: CLINIC | Age: 82
Discharge: HOME | End: 2017-04-17
Payer: MEDICARE

## 2017-04-17 DIAGNOSIS — Z95.5 S/P DRUG ELUTING CORONARY STENT PLACEMENT: ICD-10-CM

## 2017-04-17 DIAGNOSIS — R07.9 CHEST PAIN OF UNKNOWN ETIOLOGY: ICD-10-CM

## 2017-04-17 DIAGNOSIS — I25.2 HISTORY OF NON-ST ELEVATION MYOCARDIAL INFARCTION (NSTEMI): ICD-10-CM

## 2017-04-17 LAB
AORTIC VALVE REGURGITATION: ABNORMAL
DIASTOLIC DYSFUNCTION: YES
ESTIMATED PA SYSTOLIC PRESSURE: 33.03
MITRAL VALVE MOBILITY: NORMAL
RETIRED EF AND QEF - SEE NOTES: 65 (ref 55–65)

## 2017-04-17 PROCEDURE — 96372 THER/PROPH/DIAG INJ SC/IM: CPT | Mod: 59,S$GLB,, | Performed by: INTERNAL MEDICINE

## 2017-04-17 PROCEDURE — 93325 DOPPLER ECHO COLOR FLOW MAPG: CPT | Mod: S$GLB,,, | Performed by: INTERNAL MEDICINE

## 2017-04-17 PROCEDURE — 93320 DOPPLER ECHO COMPLETE: CPT | Mod: S$GLB,,, | Performed by: INTERNAL MEDICINE

## 2017-04-17 PROCEDURE — 93351 STRESS TTE COMPLETE: CPT | Mod: S$GLB,,, | Performed by: INTERNAL MEDICINE

## 2017-04-18 ENCOUNTER — RESEARCH ENCOUNTER (OUTPATIENT)
Dept: UROGYNECOLOGY | Facility: CLINIC | Age: 82
End: 2017-04-18

## 2017-04-18 ENCOUNTER — INITIAL CONSULT (OUTPATIENT)
Dept: UROGYNECOLOGY | Facility: CLINIC | Age: 82
End: 2017-04-18
Payer: MEDICARE

## 2017-04-18 VITALS
BODY MASS INDEX: 33.68 KG/M2 | HEIGHT: 62 IN | WEIGHT: 183 LBS | DIASTOLIC BLOOD PRESSURE: 60 MMHG | SYSTOLIC BLOOD PRESSURE: 122 MMHG

## 2017-04-18 DIAGNOSIS — N95.2 ATROPHIC VAGINITIS: ICD-10-CM

## 2017-04-18 DIAGNOSIS — L90.0 LICHEN SCLEROSUS: ICD-10-CM

## 2017-04-18 DIAGNOSIS — R35.1 NOCTURIA: ICD-10-CM

## 2017-04-18 DIAGNOSIS — N39.46 MIXED URGE AND STRESS INCONTINENCE: Primary | ICD-10-CM

## 2017-04-18 DIAGNOSIS — R39.15 URINARY URGENCY: ICD-10-CM

## 2017-04-18 PROCEDURE — 51701 INSERT BLADDER CATHETER: CPT | Mod: S$GLB,,, | Performed by: OBSTETRICS & GYNECOLOGY

## 2017-04-18 PROCEDURE — 99499 UNLISTED E&M SERVICE: CPT | Mod: S$GLB,,, | Performed by: OBSTETRICS & GYNECOLOGY

## 2017-04-18 PROCEDURE — 87086 URINE CULTURE/COLONY COUNT: CPT

## 2017-04-18 PROCEDURE — 1160F RVW MEDS BY RX/DR IN RCRD: CPT | Mod: S$GLB,,, | Performed by: OBSTETRICS & GYNECOLOGY

## 2017-04-18 PROCEDURE — 99204 OFFICE O/P NEW MOD 45 MIN: CPT | Mod: 25,S$GLB,, | Performed by: OBSTETRICS & GYNECOLOGY

## 2017-04-18 PROCEDURE — 1126F AMNT PAIN NOTED NONE PRSNT: CPT | Mod: S$GLB,,, | Performed by: OBSTETRICS & GYNECOLOGY

## 2017-04-18 PROCEDURE — 99999 PR PBB SHADOW E&M-EST. PATIENT-LVL III: CPT | Mod: PBBFAC,,, | Performed by: OBSTETRICS & GYNECOLOGY

## 2017-04-18 PROCEDURE — 1159F MED LIST DOCD IN RCRD: CPT | Mod: S$GLB,,, | Performed by: OBSTETRICS & GYNECOLOGY

## 2017-04-18 RX ORDER — CLOBETASOL PROPIONATE 0.5 MG/G
OINTMENT TOPICAL 2 TIMES DAILY
Qty: 60 G | Refills: 1 | Status: SHIPPED | OUTPATIENT
Start: 2017-04-18 | End: 2017-05-22

## 2017-04-18 NOTE — PROGRESS NOTES
Subject was consented for [facility] Protocol for the Collection of Women's Health Specimens  (IRB # 2016.065.A,PI - Jazmine).  The study was explained to the subject in  detail, and the Informed Consent was  reviewed, and discussed with the subject prior to consenting. All risks, and benefits, were discussed. Adequate time was given for the subject to ask questions and receive answers. Subject confirmed that all questions had been answered and  verbalized desire to participate in study, understanding that participation is voluntary and she can withdraw at any time. Subject signed Informed consent and a signed copy was provided. This Informed Consent process along with completion of Eligibility criteria was conducted prior to initiation of any study procedures. Patient was consented by Dr. Varela.

## 2017-04-18 NOTE — PROGRESS NOTES
Subjective:       Patient ID: Elisa Richardson is a 89 y.o. female.    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 Jalloh for evaluation.    HPI   Ohs Peq Urogyn Hpi      Question    4/18/2017  9:25 AM CDT     General Urogynecology: Are you experiencing the following?       Dysuria (painful urination)  No     Nocturia:  waking up at night to empty your bladder   Yes     If you answered yes to the previous question, how many times does this happen per night?  3-4     Enuresis (urine loss during sleep)  No     Dribbling urine after you urinate  No     Hematuria (urine appears red)  No     Type of stream  Weak     Urinary Incontinence (General): Are you experiencing the following?       Past consultation for incontinence: Have you ever seen someone for the evaluation of incontinence?  No     If you answered yes to the previous question, please select all the therapies you have tried.   N/a- I answered no to the previous question     Please note the effectiveness of the therapies.       Need to wear protection to keep clothes dry   Yes     If you answered yes to the previous question, please karely the protection you use.   Pads     If you wear protection, how much wetness is typically on each pad?  Light     If you wear protection, how often do you have to change per day, if applicable?   3-4     Stress Symptoms: Are you experiencing the following?       Leakage of urine with cough, laugh and/or sneeze  Yes     If you answered yes to the previous question, what is the frequency in days, weeks and/or months?  Several times a day     Leakage of urine with sex  No     Leakage of urine with bending/ lifting  No     Leakage of urine with briskly walking or jogging  Yes     If you lose urine for any other reason not previously mentioned, please note it below, if applicable.     "    Urge Symptoms: Are you experiencing the following?       Urgency ("got to go" feeling)  Yes     Urge: How frequently do you feel an urge to urinate (feeling like you "gotta go" to the bathroom and can't wait)  Several times a day     Do you experience a leakage of urine when you have a feeling of urgency?   Yes     Leakage of urine when unaware  No     Past use of anticholinergics (medications used to treat overactive bladder)  No     If you answered yes to the previous question, please karely the anticholinergics you have used:        Have you ever used Mirbetriq (aka Mirabegron)?   No     Prolapse Symptoms: Are you experiencing any of the following?        Falling out/ Bulging/ Heaviness in the vagina  No     Vaginal/ Abdominal Pain/ Pressure  No     Need to strain/ Push to void  No     Need to wait on the toilet before you void  No     Unusual position to urinate (using your hands to push back the vaginal bulge)  No     Sensation of incomplete emptying  No     Past use of pessary device  No     If you answered yes to the previous question, please list the devices you have used below.        Bowel Symptoms: Are you experiencing any of the following?       Constipation  No     Diarrhea   No     Hematochezia (bloody stool)  No     Incomplete evacuation of stool  No     Involuntary loss of formed stool  No     Fecal smearing/urgency  No     Involuntary loss of gas  No     Vaginal Symptoms: Are you experiencing any of the following?        Abnormal vaginal bleeding   No     Vaginal dryness  No     Sexually active   No     Dyspareunia (painful intercourse)  No     Estrogen use   No         HPI reviewed and confirmed with patient     GYN & OB History  No LMP recorded. Patient has had a hysterectomy.   Date of Last Pap: No result found     Past Medical History:   Diagnosis Date    Anticoagulant long-term use     Anxiety     Arthritis     Chronic rhinitis     Coronary artery disease involving native coronary artery " of native heart without angina pectoris 10/14/2016    Encounter for blood transfusion     Glucose intolerance (impaired glucose tolerance) 2/25/2013    Hyperlipidemia     Hypertension     Osteoporosis     Polyclonal gammopathy     Thyroid disease      Past Surgical History:   Procedure Laterality Date    APPENDECTOMY      EYE SURGERY      HYSTERECTOMY      TONSILLECTOMY         Current Outpatient Prescriptions:     acetaminophen (TYLENOL) 325 MG tablet, Take 325 mg by mouth every 6 (six) hours as needed.  , Disp: , Rfl:     alprazolam (XANAX) 0.25 MG tablet, TAKE 1 TABLET(0.25 MG) BY MOUTH EVERY NIGHT AS NEEDED FOR ANXIETY, Disp: 30 tablet, Rfl: 0    aspirin (ECOTRIN) 81 MG EC tablet, Take 1 tablet (81 mg total) by mouth once daily., Disp: , Rfl: 0    atorvastatin (LIPITOR) 40 MG tablet, Take 1 tablet (40 mg total) by mouth once daily., Disp: 90 tablet, Rfl: 3    cetirizine (ZYRTEC) 10 MG tablet, Take 10 mg by mouth daily as needed.  , Disp: , Rfl:     clopidogrel (PLAVIX) 75 mg tablet, Take 1 tablet (75 mg total) by mouth once daily., Disp: 30 tablet, Rfl: 11    cyanocobalamin (VITAMIN B-12) 1000 MCG tablet, Take 100 mcg by mouth once daily., Disp: , Rfl:     inhalation device (AEROCHAMBER MV), Use as directed for inhalation., Disp: 1 Device, Rfl: 3    levothyroxine (SYNTHROID) 75 MCG tablet, Take 1 tablet (75 mcg total) by mouth once daily., Disp: 90 tablet, Rfl: 3    lisinopril (PRINIVIL,ZESTRIL) 5 MG tablet, Take 1 tablet (5 mg total) by mouth 2 (two) times daily., Disp: 180 tablet, Rfl: 3    metoprolol tartrate (LOPRESSOR) 25 MG tablet, Take 1 tablet (25 mg total) by mouth 2 (two) times daily., Disp: 60 tablet, Rfl: 11    multivitamin (THERAGRAN) per tablet, Take 1 tablet by mouth once daily., Disp: , Rfl:     NASACORT AQ 55 mcg nasal inhaler, INHALE 1 TO 2 SPRAYS IN EACH NASAL EVERY DAY, Disp: 16.5 g, Rfl: 12    nitroGLYCERIN (NITROSTAT) 0.4 MG SL tablet, Place 1 tablet (0.4 mg total)  under the tongue every 5 (five) minutes as needed for Chest pain., Disp: 30 tablet, Rfl: 3    vitamin D 1000 units Tab, Take 185 mg by mouth once daily., Disp: , Rfl:     clobetasol 0.05% (TEMOVATE) 0.05 % Oint, Apply topically 2 (two) times daily., Disp: 60 g, Rfl: 1    mirabegron (MYRBETRIQ) 25 mg Tb24 ER tablet, Take 1 tablet (25 mg total) by mouth once daily., Disp: 30 tablet, Rfl: 11    Review of patient's allergies indicates:  No Known Allergies    OB History   No data available       Review of Systems  Review of Systems   Constitutional: Negative.  Negative for activity change, appetite change, chills, diaphoresis, fatigue, fever and unexpected weight change.   HENT: Negative.    Eyes: Negative.    Respiratory: Negative.  Negative for apnea, cough and wheezing.    Cardiovascular: Negative.  Negative for chest pain and palpitations.   Gastrointestinal: Positive for constipation. Negative for abdominal distention, abdominal pain, anal bleeding, blood in stool, diarrhea, nausea, rectal pain and vomiting.   Endocrine: Negative.    Genitourinary: Positive for frequency and urgency. Negative for decreased urine volume, difficulty urinating, dyspareunia, dysuria, enuresis, flank pain, genital sores, hematuria, menstrual problem, pelvic pain, vaginal bleeding, vaginal discharge and vaginal pain.   Musculoskeletal: Negative for back pain and gait problem.   Skin: Negative for color change, pallor, rash and wound.   Allergic/Immunologic: Negative for immunocompromised state.   Neurological: Negative.  Negative for dizziness and speech difficulty.   Hematological: Negative for adenopathy.   Psychiatric/Behavioral: Negative for agitation, behavioral problems, confusion and sleep disturbance.           Objective:     Physical Exam   Constitutional: She is oriented to person, place, and time. She appears well-developed.   HENT:   Head: Normocephalic and atraumatic.   Eyes: Conjunctivae and EOM are normal.   Neck: Normal  range of motion. Neck supple.   Cardiovascular: Normal rate, regular rhythm, S1 normal, S2 normal, normal heart sounds and intact distal pulses.    Pulmonary/Chest: Effort normal and breath sounds normal. She exhibits no tenderness.   Abdominal: Soft. Bowel sounds are normal. She exhibits no distension and no mass. There is no hepatosplenomegaly, splenomegaly or hepatomegaly. There is no tenderness. There is no rigidity, no rebound, no guarding and no CVA tenderness. Hernia confirmed negative in the right inguinal area and confirmed negative in the left inguinal area.   Genitourinary: Pelvic exam was performed with patient supine. Rectum normal, vagina normal, skenes normal and bartholins normal. Right labia normal and left labia normal. Urethra exhibits hypermobility. Urethra exhibits no urethral caruncle, no urethral diverticulum and no urethral mass. Right bartholin is not enlarged and not tender. Left bartholin is not enlarged and not tender. Rectal exam shows resting tone normal and active tone normal. Rectal exam shows no external hemorrhoid, no fissure, no tenderness, anal tone normal and no dovetailing. Guaiac negative stool. There is atrophy in the vagina. No foreign body, tenderness, bleeding, unspecified prolapse of vaginal walls, fistula, mesh exposure or lavator tenderness in the vagina. No vaginal discharge found. Right adnexum displays no tenderness. Left adnexum displays no tenderness. Cervix exhibits absence. Uterus is absent.   PVR: 70 ML  Empty cough stress test: Negative.  Kegel: 2/5    POP-Q  Aa: -2 Ba: -2 C: -5   GH: 3 PB: 2 TVL: 8   Ap: -2 Bp: -2 D:                      Musculoskeletal: Normal range of motion.   Lymphadenopathy:     She has no axillary adenopathy.        Right: No inguinal adenopathy present.        Left: No inguinal adenopathy present.   Neurological: She is alert and oriented to person, place, and time. She has normal strength and normal reflexes. Cranial nerves II through  XII intact. No cranial nerve deficit.   Skin: Skin is warm, dry and intact.   Psychiatric: She has a normal mood and affect. Her speech is normal and behavior is normal. Judgment normal. Cognition and memory are normal.          Assessment:        1. Mixed urge and stress incontinence    2. Atrophic vaginitis    3. Urinary urgency    4. Lichen sclerosus    5. Nocturia          Plan:          1.  Mixed urinary incontinence, urge > stress:   --Bladder diary for 3-7 days, write the number of times you go to the rest room and associated symptoms of urgency or leakage.   --Empty bladder every 3 hours.  Empty well: wait a minute, lean forward on toilet.    --Avoid dietary irritants (see sheet).  Keep diary x 3-5 days to determine your irritants.  --KEGELS: do 10 in AM and 10 in PM, holding each x 10 seconds.  When you feel urge to go, STOP, KEGEL, and when urge has passed, then go to bathroom.  Consider PT in future.    --URGE: Start Myrbetriq 25  mg daily.  SE profile reviewed.    Takes 2-4 weeks to see if will have effect.      --STRESS:  Pessary vs. Sling.     2. Lichens sclerosus   --Clobetasol twice a day for max of two weeks   --Change pads often, use barrier cream twice a day    3. Nocturia (nighttime urination): stop fluids 2 hours before bed.  If have leg swelling:  Elevate feet above chest x 1 hour before bed to get excess fluid off.  Can also use support hose (knee highs).      Approximately 50 min were spent in consult, 90 % in discussion.     Thank you for requesting consultation of your patient.  I look forward to participating in her care.    Rory Varela DO  Female Pelvic Medicine and Reconstructive Surgery  [facility] [place], [place]

## 2017-04-18 NOTE — LETTER
April 18, 2017      Shantel Jalloh MD  [address] [facility] [address]           [facility]  [address] [facility] [address]  Phone: [phone]          Patient: Elisa Richardson   MR Number: [id number]   YOB: 1927   Date of Visit: 4/18/2017       Dear Dr. Shantel Jalloh:    Thank you for referring Elisa Richardson to me for evaluation. Attached you will find relevant portions of my assessment and plan of care.    If you have questions, please do not hesitate to call me. I look forward to following Elisa Richardson along with you.    Sincerely,    Rory Varela, DO [facility]  CC:  No Recipients    If you would like to receive this communication electronically, please contact [email or web address] or [phone] to request more information on [email or web address] Link access.    For providers and/or their staff who would like to refer a patient to [facility], please contact us through our one-stop-shop provider referral line, [facility], at [phone].    If you feel you have received this communication in error or would no longer like to receive these types of communications, please e-mail [email or web address]

## 2017-04-19 ENCOUNTER — PATIENT MESSAGE (OUTPATIENT)
Dept: RESEARCH | Facility: HOSPITAL | Age: 82
End: 2017-04-19

## 2017-04-20 ENCOUNTER — PATIENT MESSAGE (OUTPATIENT)
Dept: UROGYNECOLOGY | Facility: CLINIC | Age: 82
End: 2017-04-20

## 2017-04-20 LAB — BACTERIA UR CULT: NO GROWTH

## 2017-05-05 ENCOUNTER — NURSE TRIAGE (OUTPATIENT)
Dept: ADMINISTRATIVE | Facility: CLINIC | Age: 82
End: 2017-05-05

## 2017-05-05 ENCOUNTER — OFFICE VISIT (OUTPATIENT)
Dept: INTERNAL MEDICINE | Facility: CLINIC | Age: 82
End: 2017-05-05
Payer: MEDICARE

## 2017-05-05 VITALS
DIASTOLIC BLOOD PRESSURE: 63 MMHG | HEART RATE: 68 BPM | TEMPERATURE: 98 F | OXYGEN SATURATION: 93 % | SYSTOLIC BLOOD PRESSURE: 178 MMHG | WEIGHT: 185.63 LBS | HEIGHT: 63 IN | BODY MASS INDEX: 32.89 KG/M2

## 2017-05-05 DIAGNOSIS — L03.90 WOUND CELLULITIS: ICD-10-CM

## 2017-05-05 DIAGNOSIS — L03.115 CELLULITIS OF RIGHT LOWER EXTREMITY: Primary | ICD-10-CM

## 2017-05-05 PROCEDURE — 1160F RVW MEDS BY RX/DR IN RCRD: CPT | Mod: S$GLB,,, | Performed by: INTERNAL MEDICINE

## 2017-05-05 PROCEDURE — 1125F AMNT PAIN NOTED PAIN PRSNT: CPT | Mod: S$GLB,,, | Performed by: INTERNAL MEDICINE

## 2017-05-05 PROCEDURE — 99999 PR PBB SHADOW E&M-EST. PATIENT-LVL IV: CPT | Mod: PBBFAC,,, | Performed by: INTERNAL MEDICINE

## 2017-05-05 PROCEDURE — 99214 OFFICE O/P EST MOD 30 MIN: CPT | Mod: S$GLB,,, | Performed by: INTERNAL MEDICINE

## 2017-05-05 PROCEDURE — 1159F MED LIST DOCD IN RCRD: CPT | Mod: S$GLB,,, | Performed by: INTERNAL MEDICINE

## 2017-05-05 RX ORDER — DOXYCYCLINE 100 MG/1
100 TABLET ORAL 2 TIMES DAILY
Qty: 14 TABLET | Refills: 0 | Status: SHIPPED | OUTPATIENT
Start: 2017-05-05 | End: 2017-05-12

## 2017-05-05 RX ORDER — MUPIROCIN CALCIUM 20 MG/G
CREAM TOPICAL 2 TIMES DAILY
Qty: 1 TUBE | Refills: 0 | Status: SHIPPED | OUTPATIENT
Start: 2017-05-05 | End: 2017-10-10

## 2017-05-05 NOTE — TELEPHONE ENCOUNTER
"  Reason for Disposition   Taking Coumadin (warfarin) or other strong blood thinner, or known bleeding disorder (e.g., thrombocytopenia)    Answer Assessment - Initial Assessment Questions  1. APPEARANCE of INJURY: "What does the injury look like?"       bruising   2. SIZE: "How large is the cut?"       7 inches long bruising and wide  3. BLEEDING: "Is it bleeding now?" If so, ask: "Is it difficult to stop?"       no  4. LOCATION: "Where is the injury located?"       Cut right leg while apply compression stocking  5. ONSET: "How long ago did the injury occur?"       today  6. MECHANISM: "Tell me how it happened."       -  7. TETANUS: "When was the last tetanus booster?"      -  8. PREGNANCY: "Is there any chance you are pregnant?" "When was your last menstrual period?"      -    Protocols used: ST BRUISES-A-, ST CUTS AND [id number]  Mrs. Richardson states she has bruising to lower leg. Patient is on Plavix and is concerned about bruising. Offered patient an urgent appointment, patient is requesting to speak with Dr. Jalloh.   "

## 2017-05-05 NOTE — MR AVS SNAPSHOT
[facility]  [address] [facility] [address]  Phone: [phone]  Fax: [phone]                  Elisa Richardson   2017 7:00 PM   Office Visit    Description:  Female : 1927   Provider:  Lashon Mchugh MD   Department:  [facility] Internal Medicine           Reason for Visit     leg abrasion     Heel Pain           Diagnoses this Visit        Comments    Cellulitis of right lower extremity    -  Primary     Wound cellulitis                To Do List           Future Appointments        Provider Department Dept Phone    2017 1:30 PM Rory Varela DO [facility] [phone]    2017 10:00 AM Mitul Corea MD [facility] - Cardiology [phone]    2017 8:00 AM Shantel Jalloh MD [facility] Internal Medicine [phone]      Goals (5 Years of Data)     None       These Medications        Disp Refills Start End    doxycycline monohydrate 100 mg Tab 14 tablet 0 2017    Take 1 tablet (100 mg total) by mouth 2 (two) times daily. - Oral    Pharm[facility] [id number]  [facility] [address] [facility] [place]: [phone]       mupirocin calcium 2% (BACTROBAN) 2 % cream 1 Tube 0 2017     Apply topically 2 (two) times daily. Apply to right leg wound. - Topical (Top)    [email or web address] [id number]  [facility] [place] [facility] [address] [facility] [place]: [phone]         [facility]     [facility] Care Line - 24/ Assistance  Unless otherwise directed by your provider, please contact [facility], our nurse care line that is available for  assistance.     Registered nurses in the [facility] provide: appointment scheduling, clinical advisement, health education, and other advisory services.  Call: [phone] (toll free)               Medications           Message regarding Medications     Verify the changes  and/or additions to your medication regime listed below are the same as discussed with your clinician today.  If any of these changes or additions are incorrect, please notify your healthcare provider.        START taking these NEW medications        Refills    doxycycline monohydrate 100 mg Tab 0    Sig: Take 1 tablet (100 mg total) by mouth 2 (two) times daily.    Class: Normal    Route: Oral    mupirocin calcium 2% (BACTROBAN) 2 % cream 0    Sig: Apply topically 2 (two) times daily. Apply to right leg wound.    Class: Normal    Route: Topical (Top)           Verify that the below list of medications is an accurate representation of the medications you are currently taking.  If none reported, the list may be blank. If incorrect, please contact your healthcare provider. Carry this list with you in case of emergency.           Current Medications     acetaminophen (TYLENOL) 325 MG tablet Take 325 mg by mouth every 6 (six) hours as needed.      alprazolam (XANAX) 0.25 MG tablet TAKE 1 TABLET(0.25 MG) BY MOUTH EVERY NIGHT AS NEEDED FOR ANXIETY    aspirin (ECOTRIN) 81 MG EC tablet Take 1 tablet (81 mg total) by mouth once daily.    atorvastatin (LIPITOR) 40 MG tablet Take 1 tablet (40 mg total) by mouth once daily.    cetirizine (ZYRTEC) 10 MG tablet Take 10 mg by mouth daily as needed.      clobetasol 0.05% (TEMOVATE) 0.05 % Oint Apply topically 2 (two) times daily.    clopidogrel (PLAVIX) 75 mg tablet Take 1 tablet (75 mg total) by mouth once daily.    cyanocobalamin (VITAMIN B-12) 1000 MCG tablet Take 100 mcg by mouth once daily.    doxycycline monohydrate 100 mg Tab Take 1 tablet (100 mg total) by mouth 2 (two) times daily.    inhalation device (AEROCHAMBER MV) Use as directed for inhalation.    levothyroxine (SYNTHROID) 75 MCG tablet Take 1 tablet (75 mcg total) by mouth once daily.    lisinopril (PRINIVIL,ZESTRIL) 5 MG tablet Take 1 tablet (5 mg total) by mouth 2 (two) times daily.    metoprolol tartrate  "(LOPRESSOR) 25 MG tablet Take 1 tablet (25 mg total) by mouth 2 (two) times daily.    mirabegron (MYRBETRIQ) 25 mg Tb24 ER tablet Take 1 tablet (25 mg total) by mouth once daily.    multivitamin (THERAGRAN) per tablet Take 1 tablet by mouth once daily.    mupirocin calcium 2% (BACTROBAN) 2 % cream Apply topically 2 (two) times daily. Apply to right leg wound.    NASACORT AQ 55 mcg nasal inhaler INHALE 1 TO 2 SPRAYS IN EACH NASAL EVERY DAY    nitroGLYCERIN (NITROSTAT) 0.4 MG SL tablet Place 1 tablet (0.4 mg total) under the tongue every 5 (five) minutes as needed for Chest pain.    vitamin D 1000 units Tab Take 185 mg by mouth once daily.           Clinical Reference Information           Your Vitals Were     BP Pulse Temp Height Weight SpO2    178/63 68 97.9 °F (36.6 °C) (Oral) 5' 3" (1.6 m) 84.2 kg (185 lb 10 oz) 93%    BMI                32.88 kg/m2          Blood Pressure          Most Recent Value    BP  (!)  178/63      Allergies as of 5/5/2017     No Known Allergies      Immunizations Administered on Date of Encounter - 5/5/2017     None      Instructions      Plantar Fasciitis  Plantar fasciitis is a painful swelling of the plantar fascia. The plantar fascia is a thick, fibrous layer of tissue. It covers the bones on the bottom of your foot. And it supports the foot bones in an arched position.  This can happen gradually or suddenly. It usually affects one foot at a time. Heel pain can be sharp, like a knife sticking into the bottom of your foot. You may feel pain after exercising, long-distance jogging, stair climbing, long periods of standing, or after standing up.  Risk factors include: non-active lifestyle, arthritis, diabetes, obesity or recent weight gain, flat foot, high arch. Wearing high heels, loose shoes, or shoes with poor arch support for long periods of time adds to the risk. This problem is commonly found in runners and dancers. It also found in people who stand on hard surfaces for long " periods of time.  Foot pain from this condition is usually worse in the morning. But it improves with walking. By the end of the day there may be a dull aching. Treatment requires short-term rest and controlling swelling. It may take up to 9 months before all symptoms go away. Rarely, a steroid injection into the foot, or surgery, may be needed.  Home care  · If you are overweight, lose weight to help healing.  · Choose supportive shoes with good arch support and shock absorbency. Replace athletic shoes when they become worn out. Dont walk or run barefoot.  · Premade or custom-fitted shoe inserts may be helpful. Inserts made of silicone seem to be the most effective. Custom-made inserts can be provided by a podiatrist or foot specialist, physical therapist, or orthopedist.  · Premade or custom-made night splints keep the heel stretched out while you sleep. They may prevent morning pain.  · Avoid activities that stress the feet: jogging, prolonged standing or walking, contact sports, etc.  · First thing in the morning and before sports, stretch the bottom of your feet. Gently flex your ankle so the toes move toward your knee.  · Icing may help control heel pain. Apply an ice pack to the heel for 10-20 minutes as a preventive. Or ice your heel after a severe flare-up of symptoms. You may repeat this every 1-2 hours as needed.  · You may use over-the-counter pain medicine to control pain, unless another medicine was prescribed. Anti-inflammatory pain medicines, such as ibuprofen or naproxen, may work better than acetaminophen. If you have chronic liver or kidney disease or ever had a stomach ulcer or GI bleeding, talk with your healthcare provider before using these medicines.  Follow-up care  Follow up with your healthcare provider, physical therapist, or podiatrist or foot specialist as advised.  Call for an appointment if pain worsens or there is no relief after a few weeks of home treatment. Shoe inserts, a night  splint, or a special boot may be required.  If X-rays were taken, you will be told of any new findings that may affect your care.  When to seek medical advice  Call your healthcare provider right away if any of these occur:  · Foot swelling  · Redness with increasing pain  Date Last Reviewed: 11/21/2015  © 5104-8584 [employer]. [address] [place] [address]. All rights reserved. This information is not intended as a substitute for professional medical care. Always follow your healthcare professional's instructions.             Language Assistance Services     ATTENTION: Language assistance services are available, free of charge. Please call [phone].      ATENCIÓN: Si chino enrique, tiene a gunter disposición servicios gratuitos de asistencia lingüística. Llame al [phone].     ONEL Ý: N?u b?n nói Ti?ng Vi?t, có các d?ch v? h? tr? ngôn ng? mi?n phí dành cho b?n. G?i s? [phone].         [facility] complies with applicable Federal civil rights laws and does not discriminate on the basis of race, color, national origin, age, disability, or sex.

## 2017-05-06 NOTE — PATIENT INSTRUCTIONS
Plantar Fasciitis  Plantar fasciitis is a painful swelling of the plantar fascia. The plantar fascia is a thick, fibrous layer of tissue. It covers the bones on the bottom of your foot. And it supports the foot bones in an arched position.  This can happen gradually or suddenly. It usually affects one foot at a time. Heel pain can be sharp, like a knife sticking into the bottom of your foot. You may feel pain after exercising, long-distance jogging, stair climbing, long periods of standing, or after standing up.  Risk factors include: non-active lifestyle, arthritis, diabetes, obesity or recent weight gain, flat foot, high arch. Wearing high heels, loose shoes, or shoes with poor arch support for long periods of time adds to the risk. This problem is commonly found in runners and dancers. It also found in people who stand on hard surfaces for long periods of time.  Foot pain from this condition is usually worse in the morning. But it improves with walking. By the end of the day there may be a dull aching. Treatment requires short-term rest and controlling swelling. It may take up to 9 months before all symptoms go away. Rarely, a steroid injection into the foot, or surgery, may be needed.  Home care  · If you are overweight, lose weight to help healing.  · Choose supportive shoes with good arch support and shock absorbency. Replace athletic shoes when they become worn out. Dont walk or run barefoot.  · Premade or custom-fitted shoe inserts may be helpful. Inserts made of silicone seem to be the most effective. Custom-made inserts can be provided by a podiatrist or foot specialist, physical therapist, or orthopedist.  · Premade or custom-made night splints keep the heel stretched out while you sleep. They may prevent morning pain.  · Avoid activities that stress the feet: jogging, prolonged standing or walking, contact sports, etc.  · First thing in the morning and before sports, stretch the bottom of your feet.  Gently flex your ankle so the toes move toward your knee.  · Icing may help control heel pain. Apply an ice pack to the heel for 10-20 minutes as a preventive. Or ice your heel after a severe flare-up of symptoms. You may repeat this every 1-2 hours as needed.  · You may use over-the-counter pain medicine to control pain, unless another medicine was prescribed. Anti-inflammatory pain medicines, such as ibuprofen or naproxen, may work better than acetaminophen. If you have chronic liver or kidney disease or ever had a stomach ulcer or GI bleeding, talk with your healthcare provider before using these medicines.  Follow-up care  Follow up with your healthcare provider, physical therapist, or podiatrist or foot specialist as advised.  Call for an appointment if pain worsens or there is no relief after a few weeks of home treatment. Shoe inserts, a night splint, or a special boot may be required.  If X-rays were taken, you will be told of any new findings that may affect your care.  When to seek medical advice  Call your healthcare provider right away if any of these occur:  · Foot swelling  · Redness with increasing pain  Date Last Reviewed: 11/21/2015  © 1896-5570 [employer]. [address] [place] [address], [place] [address]. All rights reserved. This information is not intended as a substitute for professional medical care. Always follow your healthcare professional's instructions.

## 2017-05-06 NOTE — PROGRESS NOTES
Subjective:       Patient ID: Elisa Richardson is a 89 y.o. female.    Chief Complaint: leg abrasion and Heel Pain    HPI:  Urgent Care Visit.    Presents with abrasion/ skin tear to lateral Right lower extremity.  Tore her skin with her fingernail while taking off compression hose this AM.  Area bruised extensively. Patient reports heavy bleeding. Patient on Plavix/Aspirin.    Also reports acute left heel pain. Began this AM. Difficult to place weight on foot due to pain.  No hx gout. No hx plantar fascitis.   Took NSAID- no relief.   Heel not tender to palpation at present.     Review of Systems  General ROS: negative for chills, fever or weight loss  Psychological ROS: negative for hallucination, depression or suicidal ideation  Ophthalmic ROS: negative for blurry vision, photophobia or eye pain  ENT ROS: negative for epistaxis, sore throat or rhinorrhea  Respiratory ROS: no cough, shortness of breath, or wheezing  Cardiovascular ROS: no chest pain or dyspnea on exertion  Gastrointestinal ROS: no abdominal pain, change in bowel habits, or black/ bloody stools  Genito-Urinary ROS: no dysuria, trouble voiding, or hematuria  Musculoskeletal ROS: negative for gait disturbance or muscular weakness +left heel pain  Neurological ROS: no syncope or seizures; no ataxia  Dermatological ROS: negative for pruritis, rash and jaundice +skin tear, bruising to right leg        Objective:      Vitals:    05/05/17 1909   BP: (!) 178/63   Pulse: 68   Temp: 97.9 °F (36.6 °C)     Physical Exam:  General appearance: alert, cooperative, no distress  Constitutional:Oriented to person, place, and time.appears well-developed and well-nourished.   HEENT: Normocephalic, atraumatic, neck symmetrical, no nasal discharge   Eyes: conjunctivae/corneas clear, PERRL, EOM's intact  Lungs: clear to auscultation bilaterally, no dullness to percussion bilaterally  Heart: regular rate and rhythm without rub; no displacement of the PMI   Abdomen: soft,  non-tender; bowel sounds normoactive; no organomegaly  Extremities: extremities symmetric; no clubbing, cyanosis, or edema   - no tenderness to deep palpation of left heel or arch  Integument: + thin skin, + bulging varicose veins +superficial tear/wound to right outer shin   + red streak extending from toes of right foot up to ankle + extensive bruising to lateral right shin  Neurologic: Alert and oriented X 3, normal strength, normal coordination and gait  Psychiatric: no pressured speech; normal affect; no evidence of impaired cognition    Assessment:       1. Cellulitis of right lower extremity    2. Wound cellulitis        Plan:       Elisa was seen today for leg abrasion and heel pain.    Diagnoses and all orders for this visit:    Cellulitis of right lower extremity  - red streak extending up right foot, area warm  - skin is thin with multiple small breaks, abrasions- likely a developing cellulits   -     doxycycline monohydrate 100 mg Tab; Take 1 tablet (100 mg total) by mouth 2 (two) times daily.    Wound cellulitis  -     mupirocin calcium 2% (BACTROBAN) 2 % cream; Apply topically 2 (two) times daily. Apply to right leg wound.    Left Heel Pain  - possibly plantar fascitis- recommend NSAIDS prn and obtaining a brace at local drugstore to keep foot in flexed position while sleeping  - if no improvement with above conservative measures, may need further evaluation by podiatry

## 2017-05-22 ENCOUNTER — OFFICE VISIT (OUTPATIENT)
Dept: CARDIOLOGY | Facility: CLINIC | Age: 82
End: 2017-05-22
Payer: MEDICARE

## 2017-05-22 ENCOUNTER — TELEPHONE (OUTPATIENT)
Dept: CARDIOLOGY | Facility: CLINIC | Age: 82
End: 2017-05-22

## 2017-05-22 ENCOUNTER — TELEPHONE (OUTPATIENT)
Dept: CARDIAC REHAB | Facility: CLINIC | Age: 82
End: 2017-05-22

## 2017-05-22 VITALS
HEART RATE: 59 BPM | DIASTOLIC BLOOD PRESSURE: 63 MMHG | WEIGHT: 177.5 LBS | BODY MASS INDEX: 31.45 KG/M2 | SYSTOLIC BLOOD PRESSURE: 135 MMHG | HEIGHT: 63 IN

## 2017-05-22 DIAGNOSIS — E78.5 HYPERLIPIDEMIA, UNSPECIFIED HYPERLIPIDEMIA TYPE: ICD-10-CM

## 2017-05-22 DIAGNOSIS — I25.2 HISTORY OF NON-ST ELEVATION MYOCARDIAL INFARCTION (NSTEMI): Primary | ICD-10-CM

## 2017-05-22 DIAGNOSIS — I51.89 DIASTOLIC DYSFUNCTION: ICD-10-CM

## 2017-05-22 DIAGNOSIS — E03.9 ACQUIRED HYPOTHYROIDISM: ICD-10-CM

## 2017-05-22 DIAGNOSIS — I77.9 BILATERAL CAROTID ARTERY DISEASE: ICD-10-CM

## 2017-05-22 DIAGNOSIS — D47.2 MGUS (MONOCLONAL GAMMOPATHY OF UNKNOWN SIGNIFICANCE): ICD-10-CM

## 2017-05-22 DIAGNOSIS — I87.2 VENOUS INSUFFICIENCY (CHRONIC) (PERIPHERAL): ICD-10-CM

## 2017-05-22 DIAGNOSIS — I10 ESSENTIAL HYPERTENSION: ICD-10-CM

## 2017-05-22 DIAGNOSIS — R07.9 CHEST PAIN OF UNKNOWN ETIOLOGY: ICD-10-CM

## 2017-05-22 DIAGNOSIS — Z95.5 S/P DRUG ELUTING CORONARY STENT PLACEMENT: ICD-10-CM

## 2017-05-22 DIAGNOSIS — F41.9 ANXIETY: ICD-10-CM

## 2017-05-22 PROCEDURE — 1159F MED LIST DOCD IN RCRD: CPT | Mod: S$GLB,,, | Performed by: INTERNAL MEDICINE

## 2017-05-22 PROCEDURE — 99999 PR PBB SHADOW E&M-EST. PATIENT-LVL III: CPT | Mod: PBBFAC,,, | Performed by: INTERNAL MEDICINE

## 2017-05-22 PROCEDURE — 99499 UNLISTED E&M SERVICE: CPT | Mod: S$GLB,,, | Performed by: INTERNAL MEDICINE

## 2017-05-22 PROCEDURE — 99213 OFFICE O/P EST LOW 20 MIN: CPT | Mod: S$GLB,,, | Performed by: INTERNAL MEDICINE

## 2017-05-22 PROCEDURE — 1126F AMNT PAIN NOTED NONE PRSNT: CPT | Mod: S$GLB,,, | Performed by: INTERNAL MEDICINE

## 2017-05-22 PROCEDURE — 1160F RVW MEDS BY RX/DR IN RCRD: CPT | Mod: S$GLB,,, | Performed by: INTERNAL MEDICINE

## 2017-05-22 NOTE — Clinical Note
Thank you for referring Elisa Richardson for follow-up of non-ST elevation myocardial infarction and placement of coronary stents. Please see my note for details of this encounter. If you have any questions, please contact me.  Thank you again for the referral.

## 2017-05-22 NOTE — Clinical Note
Thank you for referring Elisa Richardson for follow-up of non-ST elevation myocardial infarction and coronary stent placement. Please see my note for details of this encounter. If you have any questions, please contact me.  Thank you again for the referral.

## 2017-05-22 NOTE — PROGRESS NOTES
Chart has been dictated using voice recognition software.  It is not been reviewed carefully for any transcriptional errors due to this technology.   Subjective:   Patient ID:  Elisa Richardson is a 89 y.o. female who presents for follow-up of No chief complaint on file.      HPI: Patient with obesity, atrial fibrillation, hypertension, hyperlipidemia, chronic venous insufficiency s/p endovascular laser therapy (EVLT) (22-Jan-2014), MGUS (monoclonal gammopathy of undetermined significance),  Hypothyroidism, Anxiety,  NSTEMI s/p PCI with THONY to ostial PDA and POBA to PLB to prevent jailing from stent struts (23-Sep-2016), and diastolic dysfunction.    Dobutamine stress echo (17-Apr-2017):  EKG Conclusions:  1. The EKG portion of this study is negative for ischemia at a peak heart rate of 126 bpm (99% of predicted).   2. Blood pressure remained stable throughout the protocol  (Presenting BP: 190/70 Peak BP: 183/76).   3. The following arrhythmias were present: occasional PACs & PVCs.   4. There were no symptoms of chest discomfort or significant dyspnea throughout the protocol.   Echocardiographic Conclusions:     1 - Moderate left atrial enlargement.     2 - Normal left ventricular systolic function (EF 60-65%).     3 - Impaired LV relaxation, elevated LAP (grade 2 diastolic dysfunction).     4 - Normal right ventricular systolic function .     5 - The estimated PA systolic pressure is 33 mmHg.     6 - Mild aortic regurgitation.     No evidence of stress induced myocardial ischemia.     Patient states she is usually short of breath unless she takes an alprazolam.  Also, continues to get back pain similar to with MI with increased dyspnea when she sweeps or dust, but does not get pain when she walks. Patient denies any palpitations, lightheadedness, or syncope.       Past Medical History:   Diagnosis Date    Anticoagulant long-term use     Anxiety     Arthritis     Chronic rhinitis     Coronary artery disease  involving native coronary artery of native heart without angina pectoris 10/14/2016    Encounter for blood transfusion     Glucose intolerance (impaired glucose tolerance) 2/25/2013    Hyperlipidemia     Hypertension     Osteoporosis     Polyclonal gammopathy     Thyroid disease        Outpatient Medications Prior to Visit   Medication Sig Dispense Refill    acetaminophen (TYLENOL) 325 MG tablet Take 325 mg by mouth every 6 (six) hours as needed.        alprazolam (XANAX) 0.25 MG tablet TAKE 1 TABLET(0.25 MG) BY MOUTH EVERY NIGHT AS NEEDED FOR ANXIETY 30 tablet 0    aspirin (ECOTRIN) 81 MG EC tablet Take 1 tablet (81 mg total) by mouth once daily.  0    atorvastatin (LIPITOR) 40 MG tablet Take 1 tablet (40 mg total) by mouth once daily. 90 tablet 3    cetirizine (ZYRTEC) 10 MG tablet Take 10 mg by mouth daily as needed.        clobetasol 0.05% (TEMOVATE) 0.05 % Oint Apply topically 2 (two) times daily. 60 g 1    clopidogrel (PLAVIX) 75 mg tablet Take 1 tablet (75 mg total) by mouth once daily. 30 tablet 11    cyanocobalamin (VITAMIN B-12) 1000 MCG tablet Take 100 mcg by mouth once daily.      inhalation device (AEROCHAMBER MV) Use as directed for inhalation. 1 Device 3    levothyroxine (SYNTHROID) 75 MCG tablet Take 1 tablet (75 mcg total) by mouth once daily. 90 tablet 3    lisinopril (PRINIVIL,ZESTRIL) 5 MG tablet Take 1 tablet (5 mg total) by mouth 2 (two) times daily. 180 tablet 3    metoprolol tartrate (LOPRESSOR) 25 MG tablet Take 1 tablet (25 mg total) by mouth 2 (two) times daily. 60 tablet 11    mirabegron (MYRBETRIQ) 25 mg Tb24 ER tablet Take 1 tablet (25 mg total) by mouth once daily. 30 tablet 11    multivitamin (THERAGRAN) per tablet Take 1 tablet by mouth once daily.      mupirocin calcium 2% (BACTROBAN) 2 % cream Apply topically 2 (two) times daily. Apply to right leg wound. 1 Tube 0    NASACORT AQ 55 mcg nasal inhaler INHALE 1 TO 2 SPRAYS IN EACH NASAL EVERY DAY 16.5 g 12     "nitroGLYCERIN (NITROSTAT) 0.4 MG SL tablet Place 1 tablet (0.4 mg total) under the tongue every 5 (five) minutes as needed for Chest pain. 30 tablet 3    vitamin D 1000 units Tab Take 185 mg by mouth once daily.       No facility-administered medications prior to visit.        Review of Systems   Constitution: Negative for weight gain and weight loss.   HENT: Negative for nosebleeds.    Eyes: Negative for vision loss in left eye and vision loss in right eye.   Cardiovascular: Negative for claudication.        As above   Respiratory: Negative for hemoptysis, shortness of breath, sputum production and wheezing.    Endocrine: Negative for polydipsia and polyuria.   Hematologic/Lymphatic: Does not bruise/bleed easily.   Musculoskeletal: Negative for myalgias.   Gastrointestinal: Negative for change in bowel habit, hematemesis, hematochezia, melena, nausea and vomiting.   Genitourinary: Negative for hematuria.   Neurological: Negative for focal weakness and numbness.   Psychiatric/Behavioral: Positive for depression (due to grieving process for her ). The patient is nervous/anxious (patient notes significant shortness of breath that is relievedby alprazolam).       Objective:   Physical Exam   Constitutional: She is oriented to person, place, and time. She appears well-developed and well-nourished.   /63 (BP Location: Left arm, Patient Position: Sitting, BP Method: Automatic)   Pulse (!) 59   Ht 5' 3" (1.6 m)   Wt 80.5 kg (177 lb 7.5 oz)   BMI 31.44 kg/m²    Neck: Neck supple. No JVD present. Carotid bruit is not present. No thyromegaly present.   Cardiovascular: Normal rate, regular rhythm, S1 normal, S2 normal and intact distal pulses.  Exam reveals S4. Exam reveals no friction rub.    No murmur heard.  Pulmonary/Chest: Breath sounds normal. She has no wheezes. She has no rales.   Abdominal: Soft. Bowel sounds are normal. There is no hepatosplenomegaly. There is no tenderness.   Musculoskeletal: She " exhibits no edema.   Neurological: She is alert and oriented to person, place, and time. She has normal strength.   Skin: No cyanosis. Nails show no clubbing.         Lab Results   Component Value Date     04/04/2017    K 4.5 04/04/2017    BUN 20 04/04/2017    CREATININE 0.8 04/04/2017    GLU 96 04/04/2017    HGBA1C 6.4 (H) 04/04/2017    CHOL 104 (L) 04/04/2017    HDL 43 04/04/2017    LDLCALC 42.4 (L) 04/04/2017    TRIG 93 04/04/2017    CHOLHDL 41.3 04/04/2017    HGB 14.3 04/04/2017    HCT 42.6 04/04/2017     04/04/2017    INR 1.2 09/22/2016       Assessment:     1. History of non-ST elevation myocardial infarction (NSTEMI)    2. S/P drug eluting coronary stent placement    3. Hyperlipidemia, unspecified hyperlipidemia type    4. Essential hypertension    5. Chest pain of unknown etiology    6. Diastolic dysfunction    7. Bilateral carotid artery disease: 20-39% bilateral 1/15    8. Venous insufficiency (chronic) (peripheral)    9. MGUS (monoclonal gammopathy of unknown significance)    10. Acquired hypothyroidism    11. Anxiety      The patient continues to have significant shortness of breath however, it is almost always relieved with rest and antianxiety medications.  The patient is taking antianxiety medication, she no longer has any shortness of breath.  Patient's upper back pain only occurs when she is doing activity requiring use of her arms.  She does not have any symptomatic back pain with any other type of exertion.    It is not clear that her back pain is actually recurrent myocardial ischemia as opposed to musculoskeletal pain.  Her shortness of breath does not appear to be related to symptomatic heart failure, despite her diastolic dysfunction, but appears to be more related to anxiety.  At this time, no further workup will be undertaken.  The patient should return in 2 months for reevaluation of her symptoms.  During this interim period, an attempt will be made.  The patient back into  cardiac rehabilitation to objectively assess her exertional capacity and related symptoms.  Plan:     Diagnoses and all orders for this visit:    History of non-ST elevation myocardial infarction (NSTEMI)    S/P drug eluting coronary stent placement    Hyperlipidemia, unspecified hyperlipidemia type    Essential hypertension    Chest pain of unknown etiology    Diastolic dysfunction    Bilateral carotid artery disease: 20-39% bilateral 1/15    Venous insufficiency (chronic) (peripheral)    MGUS (monoclonal gammopathy of unknown significance)    Acquired hypothyroidism    Anxiety

## 2017-05-25 ENCOUNTER — TELEPHONE (OUTPATIENT)
Dept: UROGYNECOLOGY | Facility: CLINIC | Age: 82
End: 2017-05-25

## 2017-05-25 NOTE — TELEPHONE ENCOUNTER
Attempt to contact patient to reschedule apt scheduled on 5/29/2017. No answer. Tried home number [phone] No Answer & [phone] No answer.       HAO De La Torre Lpn

## 2017-05-26 DIAGNOSIS — I25.10 CORONARY ATHEROSCLEROSIS OF UNSPECIFIED TYPE OF VESSEL, NATIVE OR GRAFT: ICD-10-CM

## 2017-05-26 DIAGNOSIS — Z98.61 POSTSURGICAL PERCUTANEOUS TRANSLUMINAL CORONARY ANGIOPLASTY STATUS: Primary | ICD-10-CM

## 2017-06-02 ENCOUNTER — OFFICE VISIT (OUTPATIENT)
Dept: UROGYNECOLOGY | Facility: CLINIC | Age: 82
End: 2017-06-02
Payer: MEDICARE

## 2017-06-02 VITALS
DIASTOLIC BLOOD PRESSURE: 60 MMHG | WEIGHT: 182.31 LBS | HEIGHT: 63 IN | SYSTOLIC BLOOD PRESSURE: 134 MMHG | BODY MASS INDEX: 32.3 KG/M2

## 2017-06-02 DIAGNOSIS — N39.46 MIXED URGE AND STRESS INCONTINENCE: Primary | ICD-10-CM

## 2017-06-02 DIAGNOSIS — N95.2 ATROPHIC VAGINITIS: ICD-10-CM

## 2017-06-02 DIAGNOSIS — R39.15 URINARY URGENCY: ICD-10-CM

## 2017-06-02 LAB
BILIRUB SERPL-MCNC: ABNORMAL MG/DL
BLOOD URINE, POC: ABNORMAL
COLOR, POC UA: ABNORMAL
GLUCOSE UR QL STRIP: ABNORMAL
KETONES UR QL STRIP: ABNORMAL
LEUKOCYTE ESTERASE URINE, POC: ABNORMAL
NITRITE, POC UA: ABNORMAL
PH, POC UA: 5
PROTEIN, POC: ABNORMAL
SPECIFIC GRAVITY, POC UA: 1.01
UROBILINOGEN, POC UA: ABNORMAL

## 2017-06-02 PROCEDURE — 99999 PR PBB SHADOW E&M-EST. PATIENT-LVL III: CPT | Mod: PBBFAC,,, | Performed by: OBSTETRICS & GYNECOLOGY

## 2017-06-02 PROCEDURE — 81002 URINALYSIS NONAUTO W/O SCOPE: CPT | Mod: S$GLB,,, | Performed by: OBSTETRICS & GYNECOLOGY

## 2017-06-02 PROCEDURE — 1126F AMNT PAIN NOTED NONE PRSNT: CPT | Mod: S$GLB,,, | Performed by: OBSTETRICS & GYNECOLOGY

## 2017-06-02 PROCEDURE — 99213 OFFICE O/P EST LOW 20 MIN: CPT | Mod: S$GLB,,, | Performed by: OBSTETRICS & GYNECOLOGY

## 2017-06-02 PROCEDURE — 99499 UNLISTED E&M SERVICE: CPT | Mod: S$GLB,,, | Performed by: OBSTETRICS & GYNECOLOGY

## 2017-06-02 PROCEDURE — 1159F MED LIST DOCD IN RCRD: CPT | Mod: S$GLB,,, | Performed by: OBSTETRICS & GYNECOLOGY

## 2017-06-02 NOTE — PROGRESS NOTES
Subjective:       Patient ID: Elisa Richardson is a 89 y.o. female.    Chief Complaint:  Follow-up and Urinary Incontinence    History of Present Illness: 89 Y O F P3 with history of MI one year ago, with worsening urinary incontinence over the past year referred by Dr. Jalloh for evaluation.     Interval  HP since the last visit   1)  UI:  (+) MAGGI  (+) UUI  - improved but still wears a pad. .  (+) pads:2 times.  Daytime frequency: Q 4 -5 hours.  Nocturia: No:    (-) dysuria-  (--) hematuria,  (--) frequent UTIs.  (+) complete bladder emptying.     2)  POP:  Symptoms:(--)  .  (--) vaginal bleeding. (--) vaginal discharge. (-) sexually active.  (--) dyspareunia.   (--)  Vaginal dryness.  (--) vaginal estrogen use.  Using barrier cream      3)  BM:  (-) constipation/straining.  (--) chronic diarrhea. (--) hematochezia.  (--) fecal incontinence.  (--) fecal smearing/urgency.  (--) incomplete evacuation.         HPI       GYN & OB History  No LMP recorded. Patient has had a hysterectomy.   Date of Last Pap: No result found     Past Medical History:   Diagnosis Date    Anticoagulant long-term use     Anxiety     Arthritis     Chronic rhinitis     Coronary artery disease involving native coronary artery of native heart without angina pectoris 10/14/2016    Encounter for blood transfusion     Glucose intolerance (impaired glucose tolerance) 2/25/2013    Hyperlipidemia     Hypertension     Osteoporosis     Polyclonal gammopathy     Thyroid disease      Past Surgical History:   Procedure Laterality Date    APPENDECTOMY      EYE SURGERY      HYSTERECTOMY      TONSILLECTOMY         Current Outpatient Prescriptions:     acetaminophen (TYLENOL) 325 MG tablet, Take 325 mg by mouth every 6 (six) hours as needed.  , Disp: , Rfl:     alprazolam (XANAX) 0.25 MG tablet, TAKE 1 TABLET(0.25 MG) BY MOUTH EVERY NIGHT AS NEEDED FOR ANXIETY, Disp: 30 tablet, Rfl: 0    aspirin (ECOTRIN) 81 MG EC tablet, Take 1 tablet (81 mg  total) by mouth once daily., Disp: , Rfl: 0    atorvastatin (LIPITOR) 40 MG tablet, Take 1 tablet (40 mg total) by mouth once daily., Disp: 90 tablet, Rfl: 3    cetirizine (ZYRTEC) 10 MG tablet, Take 10 mg by mouth daily as needed.  , Disp: , Rfl:     clopidogrel (PLAVIX) 75 mg tablet, Take 1 tablet (75 mg total) by mouth once daily., Disp: 30 tablet, Rfl: 11    cyanocobalamin (VITAMIN B-12) 1000 MCG tablet, Take 100 mcg by mouth once daily., Disp: , Rfl:     inhalation device (AEROCHAMBER MV), Use as directed for inhalation., Disp: 1 Device, Rfl: 3    levothyroxine (SYNTHROID) 75 MCG tablet, Take 1 tablet (75 mcg total) by mouth once daily., Disp: 90 tablet, Rfl: 3    lisinopril (PRINIVIL,ZESTRIL) 5 MG tablet, Take 1 tablet (5 mg total) by mouth 2 (two) times daily., Disp: 180 tablet, Rfl: 3    metoprolol tartrate (LOPRESSOR) 25 MG tablet, Take 1 tablet (25 mg total) by mouth 2 (two) times daily., Disp: 60 tablet, Rfl: 11    mirabegron (MYRBETRIQ) 25 mg Tb24 ER tablet, Take 1 tablet (25 mg total) by mouth once daily., Disp: 30 tablet, Rfl: 11    multivitamin (THERAGRAN) per tablet, Take 1 tablet by mouth once daily., Disp: , Rfl:     mupirocin calcium 2% (BACTROBAN) 2 % cream, Apply topically 2 (two) times daily. Apply to right leg wound., Disp: 1 Tube, Rfl: 0    NASACORT AQ 55 mcg nasal inhaler, INHALE 1 TO 2 SPRAYS IN EACH NASAL EVERY DAY, Disp: 16.5 g, Rfl: 12    nitroGLYCERIN (NITROSTAT) 0.4 MG SL tablet, Place 1 tablet (0.4 mg total) under the tongue every 5 (five) minutes as needed for Chest pain., Disp: 30 tablet, Rfl: 3    vitamin D 1000 units Tab, Take 185 mg by mouth once daily., Disp: , Rfl:     clobetasol 0.05% (TEMOVATE) 0.05 % Oint, Apply topically 2 (two) times daily., Disp: 60 g, Rfl: 1    Review of patient's allergies indicates:  No Known Allergies    OB History   No data available       Review of Systems  Review of Systems   Constitutional: Negative.  Negative for activity change,  appetite change, chills, diaphoresis, fatigue, fever and unexpected weight change.   HENT: Negative.    Eyes: Negative.    Respiratory: Negative.  Negative for apnea, cough and wheezing.    Cardiovascular: Negative.  Negative for chest pain and palpitations.   Gastrointestinal: Positive for constipation. Negative for abdominal distention, abdominal pain, anal bleeding, blood in stool, diarrhea, nausea, rectal pain and vomiting.   Endocrine: Negative.    Genitourinary: Positive for frequency and urgency. Negative for decreased urine volume, difficulty urinating, dyspareunia, dysuria, enuresis, flank pain, genital sores, hematuria, menstrual problem, pelvic pain, vaginal bleeding, vaginal discharge and vaginal pain.   Musculoskeletal: Negative for back pain and gait problem.   Skin: Negative for color change, pallor, rash and wound.   Allergic/Immunologic: Negative for immunocompromised state.   Neurological: Negative.  Negative for dizziness and speech difficulty.   Hematological: Negative for adenopathy.   Psychiatric/Behavioral: Negative for agitation, behavioral problems, confusion and sleep disturbance.           Objective:     Physical Exam   Constitutional: She is oriented to person, place, and time. She appears well-developed.   HENT:   Head: Normocephalic and atraumatic.   Eyes: Conjunctivae and EOM are normal.   Neck: Normal range of motion. Neck supple.   Cardiovascular: Normal rate, regular rhythm, S1 normal, S2 normal, normal heart sounds and intact distal pulses.    Pulmonary/Chest: Effort normal and breath sounds normal. She exhibits no tenderness.   Abdominal: Soft. Bowel sounds are normal. She exhibits no distension and no mass. There is no hepatosplenomegaly, splenomegaly or hepatomegaly. There is no tenderness. There is no rigidity, no rebound, no guarding and no CVA tenderness. Hernia confirmed negative in the right inguinal area and confirmed negative in the left inguinal area.   Genitourinary:  Pelvic exam was performed with patient supine. Rectum normal, vagina normal, skenes normal and bartholins normal. Right labia normal and left labia normal. Urethra exhibits hypermobility. Urethra exhibits no urethral caruncle, no urethral diverticulum and no urethral mass. Right bartholin is not enlarged and not tender. Left bartholin is not enlarged and not tender. Rectal exam shows resting tone normal and active tone normal. Rectal exam shows no external hemorrhoid, no fissure, no tenderness, anal tone normal and no dovetailing. Guaiac negative stool. There is atrophy in the vagina. No foreign body, tenderness, bleeding, unspecified prolapse of vaginal walls, fistula, mesh exposure or lavator tenderness in the vagina. No vaginal discharge found. Right adnexum displays no tenderness. Left adnexum displays no tenderness. Cervix exhibits absence. Uterus is absent.   PVR: 40 ML  Empty cough stress test: Positive.  Kegel: 2/5    POP-Q  Aa: -2 Ba: -2 C: -5   GH: 3 PB: 2 TVL: 8   Ap: -2 Bp: -2 D:                      Musculoskeletal: Normal range of motion.   Lymphadenopathy:     She has no axillary adenopathy.        Right: No inguinal adenopathy present.        Left: No inguinal adenopathy present.   Neurological: She is alert and oriented to person, place, and time. She has normal strength and normal reflexes. Cranial nerves II through XII intact. No cranial nerve deficit.   Skin: Skin is warm, dry and intact.   Psychiatric: She has a normal mood and affect. Her speech is normal and behavior is normal. Judgment normal. Cognition and memory are normal.          Assessment:        No diagnosis found.      Plan:          1.  Mixed urinary incontinence, urge > stress:   --Empty bladder every 3 hours.  Empty well: wait a minute, lean forward on toilet.    --Avoid dietary irritants (see sheet).  Keep diary x 3-5 days to determine your irritants.  --KEGELS: do 10 in AM and 10 in PM, holding each x 10 seconds.  When you feel  urge to go, STOP, KEGEL, and when urge has passed, then go to bathroom.  Consider PT in future.    --URGE: Increase myrrbetriq to 50 mg daily.  SE profile reviewed.    Takes 2-4 weeks to see if will have effect.      --STRESS:  Pessary vs. Sling vs periurethral injections; recommend repeat discussion of possible benefits of an incontinence ring at the next visit.     2. Lichens sclerosus:  Itching resolved; much improved   --Clobetasol as needed  --Continue to change pads often, use barrier cream twice a day    3. Nocturia (nighttime urination): stop fluids 2 hours before bed.  If have leg swelling:  Elevate feet above chest x 1 hour before bed to get excess fluid off.  Can also use support hose (knee highs).      Approximately 30 min were spent in consult, 90 % in discussion.     Rory Varela DO  Female Pelvic Medicine and Reconstructive Surgery  [facility] [place], [place]

## 2017-06-06 ENCOUNTER — LAB VISIT (OUTPATIENT)
Dept: LAB | Facility: HOSPITAL | Age: 82
End: 2017-06-06
Attending: INTERNAL MEDICINE
Payer: MEDICARE

## 2017-06-06 DIAGNOSIS — I25.10 CORONARY ATHEROSCLEROSIS OF UNSPECIFIED TYPE OF VESSEL, NATIVE OR GRAFT: ICD-10-CM

## 2017-06-06 DIAGNOSIS — Z98.61 POSTSURGICAL PERCUTANEOUS TRANSLUMINAL CORONARY ANGIOPLASTY STATUS: ICD-10-CM

## 2017-06-06 LAB
BASOPHILS # BLD AUTO: 0.05 K/UL
BASOPHILS NFR BLD: 0.7 %
CHOLEST/HDLC SERPL: 2.5 {RATIO}
CRP SERPL-MCNC: 3.08 MG/L
DIFFERENTIAL METHOD: ABNORMAL
EOSINOPHIL # BLD AUTO: 0.3 K/UL
EOSINOPHIL NFR BLD: 4 %
ERYTHROCYTE [DISTWIDTH] IN BLOOD BY AUTOMATED COUNT: 14.6 %
GLUCOSE SERPL-MCNC: 113 MG/DL
HCT VFR BLD AUTO: 40.6 %
HDL/CHOLESTEROL RATIO: 40.5 %
HDLC SERPL-MCNC: 121 MG/DL
HDLC SERPL-MCNC: 49 MG/DL
HGB BLD-MCNC: 13.7 G/DL
LDLC SERPL CALC-MCNC: 56.2 MG/DL
LYMPHOCYTES # BLD AUTO: 2.1 K/UL
LYMPHOCYTES NFR BLD: 31.7 %
MCH RBC QN AUTO: 31.3 PG
MCHC RBC AUTO-ENTMCNC: 33.7 %
MCV RBC AUTO: 93 FL
MONOCYTES # BLD AUTO: 0.5 K/UL
MONOCYTES NFR BLD: 7.5 %
NEUTROPHILS # BLD AUTO: 3.8 K/UL
NEUTROPHILS NFR BLD: 56.1 %
NONHDLC SERPL-MCNC: 72 MG/DL
PLATELET # BLD AUTO: 182 K/UL
PMV BLD AUTO: 11.6 FL
RBC # BLD AUTO: 4.38 M/UL
TRIGL SERPL-MCNC: 79 MG/DL
WBC # BLD AUTO: 6.71 K/UL

## 2017-06-06 PROCEDURE — 85025 COMPLETE CBC W/AUTO DIFF WBC: CPT

## 2017-06-06 PROCEDURE — 82947 ASSAY GLUCOSE BLOOD QUANT: CPT

## 2017-06-06 PROCEDURE — 86141 C-REACTIVE PROTEIN HS: CPT

## 2017-06-06 PROCEDURE — 36415 COLL VENOUS BLD VENIPUNCTURE: CPT

## 2017-06-06 PROCEDURE — 80061 LIPID PANEL: CPT

## 2017-06-07 ENCOUNTER — HOSPITAL ENCOUNTER (OUTPATIENT)
Dept: CARDIOLOGY | Facility: CLINIC | Age: 82
Discharge: HOME | End: 2017-06-07
Payer: MEDICARE

## 2017-06-07 DIAGNOSIS — I25.10 CORONARY ATHEROSCLEROSIS OF UNSPECIFIED TYPE OF VESSEL, NATIVE OR GRAFT: ICD-10-CM

## 2017-06-07 DIAGNOSIS — Z98.61 POSTSURGICAL PERCUTANEOUS TRANSLUMINAL CORONARY ANGIOPLASTY STATUS: ICD-10-CM

## 2017-06-07 LAB — DIASTOLIC DYSFUNCTION: NO

## 2017-06-07 PROCEDURE — 94621 CARDIOPULM EXERCISE TESTING: CPT | Mod: S$GLB,,, | Performed by: INTERNAL MEDICINE

## 2017-06-12 ENCOUNTER — OFFICE VISIT (OUTPATIENT)
Dept: INTERNAL MEDICINE | Facility: CLINIC | Age: 82
End: 2017-06-12
Payer: MEDICARE

## 2017-06-12 ENCOUNTER — TELEPHONE (OUTPATIENT)
Dept: CARDIAC REHAB | Facility: CLINIC | Age: 82
End: 2017-06-12

## 2017-06-12 VITALS
HEART RATE: 78 BPM | SYSTOLIC BLOOD PRESSURE: 130 MMHG | BODY MASS INDEX: 30.86 KG/M2 | HEIGHT: 64 IN | DIASTOLIC BLOOD PRESSURE: 70 MMHG | WEIGHT: 180.75 LBS

## 2017-06-12 DIAGNOSIS — E03.9 ACQUIRED HYPOTHYROIDISM: ICD-10-CM

## 2017-06-12 DIAGNOSIS — E04.1 THYROID NODULE: ICD-10-CM

## 2017-06-12 DIAGNOSIS — D47.2 MGUS (MONOCLONAL GAMMOPATHY OF UNKNOWN SIGNIFICANCE): ICD-10-CM

## 2017-06-12 DIAGNOSIS — I70.209 ATHEROSCLEROSIS OF ARTERIES OF EXTREMITIES: ICD-10-CM

## 2017-06-12 DIAGNOSIS — I83.12 VARICOSE VEINS OF BOTH LOWER EXTREMITIES WITH INFLAMMATION: ICD-10-CM

## 2017-06-12 DIAGNOSIS — I51.89 DIASTOLIC DYSFUNCTION: ICD-10-CM

## 2017-06-12 DIAGNOSIS — R73.02 GLUCOSE INTOLERANCE (IMPAIRED GLUCOSE TOLERANCE): ICD-10-CM

## 2017-06-12 DIAGNOSIS — I25.10 CORONARY ARTERY DISEASE INVOLVING NATIVE CORONARY ARTERY OF NATIVE HEART WITHOUT ANGINA PECTORIS: ICD-10-CM

## 2017-06-12 DIAGNOSIS — I77.9 BILATERAL CAROTID ARTERY DISEASE: ICD-10-CM

## 2017-06-12 DIAGNOSIS — I83.11 VARICOSE VEINS OF BOTH LOWER EXTREMITIES WITH INFLAMMATION: ICD-10-CM

## 2017-06-12 DIAGNOSIS — I87.2 VENOUS INSUFFICIENCY (CHRONIC) (PERIPHERAL): ICD-10-CM

## 2017-06-12 DIAGNOSIS — F41.1 GENERALIZED ANXIETY DISORDER: Primary | ICD-10-CM

## 2017-06-12 DIAGNOSIS — E66.09 NON MORBID OBESITY DUE TO EXCESS CALORIES: ICD-10-CM

## 2017-06-12 DIAGNOSIS — I25.2 HISTORY OF NON-ST ELEVATION MYOCARDIAL INFARCTION (NSTEMI): ICD-10-CM

## 2017-06-12 DIAGNOSIS — D69.2 SENILE PURPURA: ICD-10-CM

## 2017-06-12 DIAGNOSIS — I10 ESSENTIAL HYPERTENSION: ICD-10-CM

## 2017-06-12 PROBLEM — R07.9 CHEST PAIN OF UNKNOWN ETIOLOGY: Status: RESOLVED | Noted: 2017-04-07 | Resolved: 2017-06-12

## 2017-06-12 PROBLEM — N39.46 MIXED INCONTINENCE URGE AND STRESS: Status: RESOLVED | Noted: 2017-04-04 | Resolved: 2017-06-12

## 2017-06-12 PROCEDURE — 99999 PR PBB SHADOW E&M-EST. PATIENT-LVL III: CPT | Mod: PBBFAC,,, | Performed by: INTERNAL MEDICINE

## 2017-06-12 PROCEDURE — 99214 OFFICE O/P EST MOD 30 MIN: CPT | Mod: S$GLB,,, | Performed by: INTERNAL MEDICINE

## 2017-06-12 PROCEDURE — 1125F AMNT PAIN NOTED PAIN PRSNT: CPT | Mod: S$GLB,,, | Performed by: INTERNAL MEDICINE

## 2017-06-12 PROCEDURE — 99499 UNLISTED E&M SERVICE: CPT | Mod: S$GLB,,, | Performed by: INTERNAL MEDICINE

## 2017-06-12 PROCEDURE — 1159F MED LIST DOCD IN RCRD: CPT | Mod: S$GLB,,, | Performed by: INTERNAL MEDICINE

## 2017-06-12 RX ORDER — ALPRAZOLAM 0.25 MG/1
TABLET ORAL
Qty: 30 TABLET | Refills: 3 | Status: SHIPPED | OUTPATIENT
Start: 2017-06-12 | End: 2017-10-30 | Stop reason: SDUPTHER

## 2017-06-12 NOTE — PROGRESS NOTES
Subjective:       Patient ID: Elisa Richardson is a 89 y.o. female.    Chief Complaint: Follow-up and Medication Refill    Follow up multiple issues    Still grieving her 's death in March, doing as well as can be expected.   No further hospice counseling.  She feels well at home by herself. Strong family support.    Ongoing Cardiology follow up.    BP doing well.    No syncope or chest pain.  Some chronic shortness of breath.  Is hoping to get some rehabilitation.    Had cellulitis LLE May 2017, doing better.    Seen in UroGYN as well recently.  Meds adjusted.    Thin skin, easy bruising.  Has a lesion left pinky; I have recommended a dermatology follow-up.  She follows with Dr. [facility] outside of this institution.  She will schedule this appointment.    Carotids acceptable for 17.  She is due for thyroid ultrasound.  Labs reviewed, all acceptable.    Weight up a little bit.  Glucose slightly elevated.  Lifestyle issues discussed.  No polydipsia or polyuria.    Patient Active Problem List:     Anxiety     Essential hypertension     Acquired hypothyroidism     MGUS (monoclonal gammopathy of unknown significance)     Chronic rhinitis     Glucose intolerance (impaired glucose tolerance)     Varicose veins of both lower extremities with inflammation     Venous insufficiency (chronic) (peripheral)     Atherosclerosis of arteries of extremities     Left knee DJD     Thyroid nodules: see u/s 12/14     Bilateral carotid artery disease: 0-19% bilateral 4/17   Vitamin D deficiency disease     History of non-ST elevation myocardial infarction (NSTEMI)     Generalized anxiety disorder     Acute diastolic heart failure     Hyperlipidemia     Coronary artery disease involving native coronary artery of native heart without angina pectoris     Mixed incontinence urge and stress     S/P drug eluting coronary stent placement     Diastolic dysfunction     Chest pain of unknown etiology     Mixed urge and stress incontinence      Lichen sclerosus     Nocturia            Review of Systems   Constitutional: Negative for chills, fatigue and fever.   HENT: Negative for congestion, ear pain and postnasal drip.    Eyes: Negative.    Respiratory: Positive for shortness of breath. Negative for cough, chest tightness and wheezing.         Stable   Cardiovascular: Positive for leg swelling.        Chronic edema and varicose veins; cellulitis resolved   Gastrointestinal: Negative.    Genitourinary: Positive for frequency and urgency.   Musculoskeletal: Positive for arthralgias.   Neurological: Negative for tremors, seizures, syncope and weakness.   Psychiatric/Behavioral: Positive for sleep disturbance. Negative for decreased concentration and dysphoric mood. The patient is nervous/anxious.        Objective:      Physical Exam   Constitutional: She is oriented to person, place, and time. She appears well-developed and well-nourished.   HENT:   Head: Normocephalic and atraumatic.   Right Ear: External ear normal.   Left Ear: External ear normal.   Nose: Nose normal.   Mouth/Throat: Oropharynx is clear and moist. No oropharyngeal exudate.   Eyes: Conjunctivae and EOM are normal. No scleral icterus.   Neck: Normal range of motion. Neck supple. No JVD present. Thyromegaly present.   Cardiovascular: Normal rate, regular rhythm, normal heart sounds and intact distal pulses.  Exam reveals no gallop.    No murmur heard.  Pulmonary/Chest: Effort normal and breath sounds normal. No respiratory distress. She has no wheezes. She exhibits no tenderness.   Abdominal: Soft. Bowel sounds are normal. She exhibits no distension and no mass. There is no tenderness. There is no rebound and no guarding.   Musculoskeletal: Normal range of motion. She exhibits edema. She exhibits no tenderness.   Trace edema; varicose veins   Lymphadenopathy:     She has no cervical adenopathy.   Neurological: She is alert and oriented to person, place, and time. She displays normal  reflexes. No cranial nerve deficit. Coordination normal.   Skin: Skin is warm. No rash noted. No erythema.   Actinic changes, some senile purpura.  Tiny lesion left pinky medial aspect PIP   Psychiatric: She has a normal mood and affect. Her behavior is normal. Judgment and thought content normal.   Nursing note and vitals reviewed.      Assessment:       1. Generalized anxiety disorder    2. Atherosclerosis of arteries of extremities    3. Coronary artery disease involving native coronary artery of native heart without angina pectoris    4. Essential hypertension    5. History of non-ST elevation myocardial infarction (NSTEMI)    6. Varicose veins of both lower extremities with inflammation    7. Venous insufficiency (chronic) (peripheral)    8. MGUS (monoclonal gammopathy of unknown significance)    9. Acquired hypothyroidism    10. Glucose intolerance (impaired glucose tolerance)    11. Thyroid nodules: see u/s 12/14    12. Diastolic dysfunction    13. Bilateral carotid artery disease: 0-19% bilateral 4/17    14. Senile purpura    15. Non morbid obesity due to excess calories        Plan:         Generalized anxiety disorder: Reviewed lifestyle issues, exercise, stress reduction, sleep hygiene.  She is able to contract for safety.  Pattern of use of medication has been stable.  No SI or HI.   -     alprazolam (XANAX) 0.25 MG tablet; TAKE 1 TABLET(0.25 MG) BY MOUTH EVERY NIGHT AS NEEDED FOR ANXIETY  Dispense: 30 tablet; Refill: 3.  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  No alarm symptoms  -     Hemoglobin A1c; Future; Expected date: 06/12/2017    Thyroid nodules: see u/s 12/14: Ultrasound ordered, needs to be scheduled     Diastolic dysfunction: Stable     Bilateral carotid artery disease: 0-19% bilateral 4/17: Stable     Senile purpura: Reviewed, also aggravated by blood thinner.  Keep dermatology follow up     Non morbid obesity due to excess calories: Exercise, lifestyle modification    Shingles vaccine today  EP with labs and ultrasound in 4 months  Grief counseling provided, she declines further assessment or treatment.  Able to contract for safety.

## 2017-06-12 NOTE — PATIENT INSTRUCTIONS
Understanding Anxiety Disorders  Almost everyone gets nervous now and then. Its normal to have knots in your stomach before a test, or for your heart to race on a first date. But an anxiety disorder is much more than a case of nerves. In fact, its symptoms may be overwhelming. But treatment can relieve many of these symptoms. Talking to your doctor is the first step.    What are anxiety disorders?  An anxiety disorder causes intense feelings of panic and fear. These feelings may arise for no apparent reason. And they tend to recur again and again. They may prevent you from coping with life and cause you great distress. As a result, you may avoid anything that triggers your fear. In extreme cases, you may never leave the house. Anxiety disorders may cause other symptoms, such as:  · Obsessive thoughts you cant control  · Constant nightmares or painful thoughts of the past  · Nausea, sweating, and muscle tension  · Difficulty sleeping or concentrating  What causes anxiety disorders?  Anxiety disorders tend to run in families. For some people, childhood abuse or neglect may play a role. For others, stressful life events or trauma may trigger anxiety disorders. Anxiety can trigger low self-esteem and poor coping skills.  Common anxiety disorders  · Panic disorder: This causes an intense fear of being in danger.  · Phobias: These are extreme fears of certain objects, places, or events.  · Obsessive-compulsive disorder: This causes you to have unwanted thoughts. You also may perform certain actions over and over.  · Posttraumatic stress disorder: This occurs in people who have survived a terrible ordeal. It can cause nightmares and flashbacks about the event.  · Generalized anxiety disorder: This causes constant worry that can greatly disrupt your life.   Getting better  You may believe that nothing can help you. Or, you might fear what others may think. But most anxiety symptoms can be eased. Having an anxiety  disorder is nothing to be ashamed of. Most people do best with treatment that combines medication and therapy. Although these arent cures, they can help you live a healthier life.  Date Last Reviewed: 2/11/2015  © 5016-7328 [employer]. [address] [place] [address], [place] [address]. All rights reserved. This information is not intended as a substitute for professional medical care. Always follow your healthcare professional's instructions.

## 2017-06-13 ENCOUNTER — CLINICAL SUPPORT (OUTPATIENT)
Dept: CARDIAC REHAB | Facility: CLINIC | Age: 82
End: 2017-06-13
Payer: MEDICARE

## 2017-06-13 ENCOUNTER — HOSPITAL ENCOUNTER (OUTPATIENT)
Dept: RADIOLOGY | Facility: HOSPITAL | Age: 82
Discharge: HOME | End: 2017-06-13
Attending: INTERNAL MEDICINE
Payer: MEDICARE

## 2017-06-13 ENCOUNTER — PATIENT MESSAGE (OUTPATIENT)
Dept: DERMATOLOGY | Facility: CLINIC | Age: 82
End: 2017-06-13

## 2017-06-13 VITALS
SYSTOLIC BLOOD PRESSURE: 142 MMHG | OXYGEN SATURATION: 97 % | HEART RATE: 66 BPM | RESPIRATION RATE: 18 BRPM | DIASTOLIC BLOOD PRESSURE: 60 MMHG

## 2017-06-13 DIAGNOSIS — Z98.61 POSTSURGICAL PERCUTANEOUS TRANSLUMINAL CORONARY ANGIOPLASTY STATUS: ICD-10-CM

## 2017-06-13 DIAGNOSIS — I25.2 HISTORY OF NON-ST ELEVATION MYOCARDIAL INFARCTION (NSTEMI): ICD-10-CM

## 2017-06-13 DIAGNOSIS — Z95.5 S/P DRUG ELUTING CORONARY STENT PLACEMENT: ICD-10-CM

## 2017-06-13 DIAGNOSIS — E04.1 THYROID NODULE: ICD-10-CM

## 2017-06-13 DIAGNOSIS — I25.10 CORONARY ATHEROSCLEROSIS OF UNSPECIFIED TYPE OF VESSEL, NATIVE OR GRAFT: ICD-10-CM

## 2017-06-13 PROCEDURE — 93798 PHYS/QHP OP CAR RHAB W/ECG: CPT | Mod: S$GLB,,, | Performed by: INTERNAL MEDICINE

## 2017-06-13 PROCEDURE — 99999 PR PBB SHADOW E&M-EST. PATIENT-LVL III: CPT | Mod: PBBFAC,,,

## 2017-06-13 PROCEDURE — 76536 US EXAM OF HEAD AND NECK: CPT | Mod: 26,,, | Performed by: RADIOLOGY

## 2017-06-13 PROCEDURE — 76536 US EXAM OF HEAD AND NECK: CPT | Mod: TC

## 2017-06-13 NOTE — PROGRESS NOTES
Met with Elisa Richardson for orientation to Phase II cardiac rehab.      Weight: 182 lbs  BMI: 32.23  Abdominal girth: 43.8 inches  Body fat: 27%    Pt's weight goal is to lose 5-10 lbs every year.    Labs reviewed with patient. Patient confirms she is taking atorvastatin for cholesterol control.    Lab Results   Component Value Date    CHOL 121 06/06/2017     Lab Results   Component Value Date    HDL 49 06/06/2017     Lab Results   Component Value Date    LDLCALC 56.2 (L) 06/06/2017     Lab Results   Component Value Date    TRIG 79 06/06/2017     Lab Results   Component Value Date    CHOLHDL 40.5 06/06/2017         Lab Results   Component Value Date    GLUF 113 (H) 06/06/2017     Lab Results   Component Value Date    HGBA1C 6.4 (H) 04/04/2017       Vitamins/supplements: B12, Vitamin D3, Vitamin E, MVI    Patient eats 2-3 meals daily.  Pt prepares meals at home. Pt reports that she does not season food. Pt reports poor appetite and lack of interest in eating. Pt reports that it is difficult to cook for 1 person.  Denies use of a salt shaker at the table on prepared foods. Dines out 5 meals per week.  No fried foods. Chooses fish 1x/week. Patient willing to increase fish intake (non-fried varieties) to a goal of 2-3 servings per week.  Beverages include water, diet soda, coffee (black).  Alcohol: none.    3-day food record given to patient to complete and return for nutritional assessment.  Will follow Elisa Richardson while enrolled in Phase II cardiac rehab and encourage compliance to 2 gram sodium, Mediterranean style eating.      Rhonda Mirza MS, RD, LDN

## 2017-06-13 NOTE — PROGRESS NOTES
Patient here for Phase II Cardiac Rehab orientation accompanied by her son-in-law.      Discussed QOL, Risk factors & goals with patient.  Head to toe nursing assessment done.  Vital signs obtained.  No edema noted bilaterally.  Compression stockings on bilaterally.    QOL:  Discussed Beronica & SF36 Questionnaire scores with patient.  She recently lost her  in March & is still grieving.  She reports that things are improving & hopes that beginning in the rehab program will help her.  Patient has been instructed to notify staff in the event that circumstances worsen.  Patient verbalizes understanding.    RISK FACTORS:  The following risk factors are present:  hyperlipidemia, hypertension, obesity, positive family history, sedentary lifestyle, stress    GOALS:  Patient has set the following goals:  Decrease cholesterol level  Increase exercise tolerance  Increase knowledge of CAD  Decrease blood pressure  Weight loss  ID & manage personal areas of stress  Learn more about healthy eating    Discussed Cardiac Rehab program in depth with patient.  Medication list updated per patient & marked as reviewed.  Patient has been instructed to notify staff of any problems while attending rehab (ie: chest pain, shortness of breath, lightheadedness, dizziness).  Patient has been instructed to monitor blood pressure readings outside of rehab & to keep a daily log of the readings.  Patient verbalizes understanding.    Dianna Obando RN  Cardiac Rehab Nurse

## 2017-06-13 NOTE — PROGRESS NOTES
Exercise:  Met with the patient for orientation.  Consent forms were signed, entry CPX test results were reviewed, proper attire and shoes were discussed, and strength assessment was performed.         Entry CPX test results included weight (182 lb), abdominal girth (43.80 in), BMI (32.23), and body composition (27%).  An estimated MET Level of 3.0 and a Peak VO2 of 12.3ml/kg/min (3.5 actual METS) were measured.  This places the patient in the high risk category.  Upon exit CPX an estimated MET Level of 3.9 will be desired to achieve the goal of a 30% improvement.     Based on entry CPX test, the target heart rate range for patient is rest + 20 bpm.  The patient will attend cardiac rehab class 3 times per week which will include both aerobic and resistance training which will be modified to fit limitations.  Aerobic exercise will be 30-60 minutes with a goal intensity of 12-15 on the RPE scale.  Resistance training will incorporate free weights 1-2 sets, 10-15 repetitions with a beginning weight of 0 to 3 pounds based on strength assessment.    There were no limitations noted by patient other than leg weakness and fatigue.  The patient stated she is currently walking around her block about 3 to 4 days/week for an estimate of 10 to 15 minutes.  Patient was encouraged to continue exercising aerobically and to prepare to exercise at least two additional days per week outside of attending cardiac rehab class for a minimum of 30 minutes.  We discussed the possibility of her walking the block 2 times/day so that the walking totals to 30 minutes because she stated she gets tired.  The patient stated understanding.      The patient will begin Cardiac Rehab when there is an availability at 7:00 am.    Exercise prescription will be adjusted based on tolerance of exercise intensity by patient.    Monica Khanna., CEP

## 2017-06-21 ENCOUNTER — CLINICAL SUPPORT (OUTPATIENT)
Dept: CARDIAC REHAB | Facility: CLINIC | Age: 82
End: 2017-06-21
Payer: COMMERCIAL

## 2017-06-21 DIAGNOSIS — I25.10 CORONARY ATHEROSCLEROSIS OF UNSPECIFIED TYPE OF VESSEL, NATIVE OR GRAFT: ICD-10-CM

## 2017-06-21 DIAGNOSIS — Z98.61 POSTSURGICAL PERCUTANEOUS TRANSLUMINAL CORONARY ANGIOPLASTY STATUS: ICD-10-CM

## 2017-06-21 PROCEDURE — 93798 PHYS/QHP OP CAR RHAB W/ECG: CPT | Mod: S$GLB,,, | Performed by: INTERNAL MEDICINE

## 2017-06-23 ENCOUNTER — CLINICAL SUPPORT (OUTPATIENT)
Dept: CARDIAC REHAB | Facility: CLINIC | Age: 82
End: 2017-06-23
Payer: COMMERCIAL

## 2017-06-23 DIAGNOSIS — I25.10 CORONARY ATHEROSCLEROSIS OF UNSPECIFIED TYPE OF VESSEL, NATIVE OR GRAFT: ICD-10-CM

## 2017-06-23 DIAGNOSIS — Z98.61 S/P PERCUTANEOUS TRANSLUMINAL CORONARY ANGIOPLASTY: ICD-10-CM

## 2017-06-23 PROCEDURE — 93798 PHYS/QHP OP CAR RHAB W/ECG: CPT | Mod: S$GLB,,, | Performed by: INTERNAL MEDICINE

## 2017-06-26 ENCOUNTER — CLINICAL SUPPORT (OUTPATIENT)
Dept: CARDIAC REHAB | Facility: CLINIC | Age: 82
End: 2017-06-26
Payer: MEDICARE

## 2017-06-26 DIAGNOSIS — I25.10 CORONARY ATHEROSCLEROSIS OF UNSPECIFIED TYPE OF VESSEL, NATIVE OR GRAFT: ICD-10-CM

## 2017-06-26 DIAGNOSIS — Z98.61 POSTSURGICAL PERCUTANEOUS TRANSLUMINAL CORONARY ANGIOPLASTY STATUS: ICD-10-CM

## 2017-06-26 PROCEDURE — 93798 PHYS/QHP OP CAR RHAB W/ECG: CPT | Mod: S$GLB,,, | Performed by: INTERNAL MEDICINE

## 2017-07-28 ENCOUNTER — TELEPHONE (OUTPATIENT)
Dept: INTERNAL MEDICINE | Facility: CLINIC | Age: 82
End: 2017-07-28

## 2017-07-28 NOTE — TELEPHONE ENCOUNTER
Left detail message on voce mail about apt schedule if she needs to be seen sooner she can call back

## 2017-07-28 NOTE — TELEPHONE ENCOUNTER
----- Message from Brittany Odell sent at 7/28/2017  1:06 PM CDT -----  Contact: Patient/[phone] home or [phone] cell  Requesting an earlier appt date or time    Next available appt: 10/02/2017    Nature of the appt: Headache/arm hurts when lifting    Does patient have appt scheduled?: Yes 10/10/2017 for follow up    Please call and advise.    Thank You

## 2017-08-01 ENCOUNTER — TELEPHONE (OUTPATIENT)
Dept: INTERNAL MEDICINE | Facility: CLINIC | Age: 82
End: 2017-08-01

## 2017-08-01 ENCOUNTER — HOSPITAL ENCOUNTER (OUTPATIENT)
Dept: RADIOLOGY | Facility: HOSPITAL | Age: 82
Discharge: HOME | End: 2017-08-01
Attending: INTERNAL MEDICINE
Payer: MEDICARE

## 2017-08-01 ENCOUNTER — OFFICE VISIT (OUTPATIENT)
Dept: INTERNAL MEDICINE | Facility: CLINIC | Age: 82
End: 2017-08-01
Payer: MEDICARE

## 2017-08-01 VITALS
HEIGHT: 65 IN | BODY MASS INDEX: 29.38 KG/M2 | DIASTOLIC BLOOD PRESSURE: 80 MMHG | SYSTOLIC BLOOD PRESSURE: 145 MMHG | WEIGHT: 176.38 LBS

## 2017-08-01 DIAGNOSIS — I25.10 CORONARY ARTERY DISEASE INVOLVING NATIVE CORONARY ARTERY OF NATIVE HEART WITHOUT ANGINA PECTORIS: ICD-10-CM

## 2017-08-01 DIAGNOSIS — I70.0 AORTIC ATHEROSCLEROSIS: ICD-10-CM

## 2017-08-01 DIAGNOSIS — J84.10 CALCIFIED GRANULOMA OF LUNG: ICD-10-CM

## 2017-08-01 DIAGNOSIS — I87.2 VENOUS INSUFFICIENCY (CHRONIC) (PERIPHERAL): ICD-10-CM

## 2017-08-01 DIAGNOSIS — M25.511 RIGHT SHOULDER PAIN, UNSPECIFIED CHRONICITY: Primary | ICD-10-CM

## 2017-08-01 DIAGNOSIS — M25.511 RIGHT SHOULDER PAIN, UNSPECIFIED CHRONICITY: ICD-10-CM

## 2017-08-01 PROCEDURE — 73030 X-RAY EXAM OF SHOULDER: CPT | Mod: 26,RT,, | Performed by: RADIOLOGY

## 2017-08-01 PROCEDURE — 99214 OFFICE O/P EST MOD 30 MIN: CPT | Mod: S$GLB,,, | Performed by: INTERNAL MEDICINE

## 2017-08-01 PROCEDURE — 1125F AMNT PAIN NOTED PAIN PRSNT: CPT | Mod: S$GLB,,, | Performed by: INTERNAL MEDICINE

## 2017-08-01 PROCEDURE — 99499 UNLISTED E&M SERVICE: CPT | Mod: S$GLB,,, | Performed by: INTERNAL MEDICINE

## 2017-08-01 PROCEDURE — 1159F MED LIST DOCD IN RCRD: CPT | Mod: S$GLB,,, | Performed by: INTERNAL MEDICINE

## 2017-08-01 PROCEDURE — 73030 X-RAY EXAM OF SHOULDER: CPT | Mod: TC,RT

## 2017-08-01 PROCEDURE — 99999 PR PBB SHADOW E&M-EST. PATIENT-LVL III: CPT | Mod: PBBFAC,,, | Performed by: INTERNAL MEDICINE

## 2017-08-01 NOTE — TELEPHONE ENCOUNTER
Please let her know that her x-ray shows inflammation, some spurring and calcification; I am recommending that she make the Orthopedic appointment for possible injection.  No fracture or dislocation.  Thank you

## 2017-08-01 NOTE — PROGRESS NOTES
Subjective:       Patient ID: Elisa Richardson is a 89 y.o. female.    Chief Complaint: Arm Pain (R)    [facility] appt    Some pain R shoulder x 1 month; no trauma.  Some reduced ROM; some trouble rasing it past horizontal.  Motrin helped; tylenol makes her sleep.    Sx no better or worse- same x weeks.    Has been in [place]; daughter [place] with severe MVA, required cervical fusion several levels.  However she did not do any heavy lifting or straining.    Long discussion about her varicose veins.  She would like a procedure to prevent blood clots.  It is not clear to me that an EVLT will help; I have asked her to discuss this with her general cardiologist as the vascular medicine doctor she was seeing has left.  She is using her support hose and is taking meds.      CAd, stable without sx.  Respiratory issues also all stable without cough or SOB.    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     Venous insufficiency (chronic) (peripheral)     Atherosclerosis of arteries of extremities     Left knee DJD     Thyroid nodules: see u/s 12/14     Bilateral carotid artery disease: 0-19% bilateral 4/17     Vitamin D deficiency disease     History of non-ST elevation myocardial infarction (NSTEMI)     Generalized anxiety disorder     Mixed hyperlipidemia     Coronary artery disease involving native coronary artery of native heart without angina pectoris     S/P drug eluting coronary stent placement     Diastolic dysfunction     Mixed urge and stress incontinence     Lichen sclerosus     Nocturia     Senile purpura     Non morbid obesity due to excess calories     Aortic atherosclerosis: see CXR 2012     Calcified granuloma of lung: see CXR 2012        Arm Pain    Pertinent negatives include no  chest pain or numbness.     Review of Systems   Constitutional: Negative for fatigue and fever.   Respiratory: Negative for cough, shortness of breath and wheezing.    Cardiovascular: Positive for leg swelling. Negative for chest pain.        Varicose veins   Gastrointestinal: Negative for abdominal pain.   Genitourinary: Negative for difficulty urinating and hematuria.   Musculoskeletal: Positive for arthralgias and back pain.   Skin: Negative for rash.   Neurological: Negative for weakness and numbness.       Objective:      Physical Exam   Constitutional: She is oriented to person, place, and time. She appears well-developed and well-nourished.   HENT:   Head: Normocephalic and atraumatic.   Neck: Normal range of motion. Neck supple.   Cardiovascular: Normal rate and regular rhythm.    No murmur heard.  Pulmonary/Chest: Effort normal and breath sounds normal. No respiratory distress. She has no wheezes.   Abdominal: Soft. She exhibits no distension.   Musculoskeletal: She exhibits edema.   Varicose veins  Decreased ROM R shoulder   Neurological: She is oriented to person, place, and time. She displays normal reflexes. No cranial nerve deficit.   Skin: Skin is warm and dry.   Psychiatric: She has a normal mood and affect. Her behavior is normal.       Assessment:       1. Right shoulder pain, unspecified chronicity    2. Aortic atherosclerosis: see CXR 2012    3. Calcified granuloma of lung: see CXR 2012    4. Venous insufficiency (chronic) (peripheral)    5. Coronary artery disease involving native coronary artery of native heart without angina pectoris        Plan:         Elisa was seen today for arm pain.    Diagnoses and all orders for this visit:    Right shoulder pain, unspecified chronicity: tylenol, cool compresses; avoid heavy lifting or straining; activity as tolerated  -     X-Ray Shoulder Trauma 3 view Right; Future  -     Ambulatory referral to Orthopedics    Aortic atherosclerosis: see CXR 2012:  stable on regimen    Calcified granuloma of lung: see CXR 2012: no current breathing issues    Venous insufficiency (chronic) (peripheral): compression hose; elevation.  Cardiology follow up    Coronary artery disease involving native coronary artery of native heart without angina pectoris: no current sx    I will review all studies and determine further tx depending on findings  Patient counseled for over 50% of her 25 minute appt, all questions answered,  chart reviewed and care coordinated.

## 2017-08-02 ENCOUNTER — OFFICE VISIT (OUTPATIENT)
Dept: UROGYNECOLOGY | Facility: CLINIC | Age: 82
End: 2017-08-02
Payer: MEDICARE

## 2017-08-02 VITALS — DIASTOLIC BLOOD PRESSURE: 70 MMHG | WEIGHT: 178 LBS | SYSTOLIC BLOOD PRESSURE: 140 MMHG | BODY MASS INDEX: 29.62 KG/M2

## 2017-08-02 DIAGNOSIS — R35.1 NOCTURIA: ICD-10-CM

## 2017-08-02 DIAGNOSIS — N39.46 MIXED STRESS AND URGE URINARY INCONTINENCE: ICD-10-CM

## 2017-08-02 DIAGNOSIS — L90.0 LICHEN SCLEROSUS: Primary | ICD-10-CM

## 2017-08-02 DIAGNOSIS — N95.2 VAGINAL ATROPHY: ICD-10-CM

## 2017-08-02 PROCEDURE — 1125F AMNT PAIN NOTED PAIN PRSNT: CPT | Mod: S$GLB,,, | Performed by: NURSE PRACTITIONER

## 2017-08-02 PROCEDURE — 3008F BODY MASS INDEX DOCD: CPT | Mod: S$GLB,,, | Performed by: NURSE PRACTITIONER

## 2017-08-02 PROCEDURE — 99499 UNLISTED E&M SERVICE: CPT | Mod: S$GLB,,, | Performed by: NURSE PRACTITIONER

## 2017-08-02 PROCEDURE — 99999 PR PBB SHADOW E&M-EST. PATIENT-LVL III: CPT | Mod: PBBFAC,,, | Performed by: NURSE PRACTITIONER

## 2017-08-02 PROCEDURE — 1159F MED LIST DOCD IN RCRD: CPT | Mod: S$GLB,,, | Performed by: NURSE PRACTITIONER

## 2017-08-02 PROCEDURE — 99213 OFFICE O/P EST LOW 20 MIN: CPT | Mod: S$GLB,,, | Performed by: NURSE PRACTITIONER

## 2017-08-02 RX ORDER — CLOBETASOL PROPIONATE 0.5 MG/G
CREAM TOPICAL
Qty: 15 G | Refills: 2 | Status: SHIPPED | OUTPATIENT
Start: 2017-08-03 | End: 2017-08-13

## 2017-08-02 NOTE — PATIENT INSTRUCTIONS
Arthralgia    Arthralgia is the term for pain in or around the joint. It is a symptom, not a disease. This pain may involve one or more joints. In some cases, the pain moves from joint to joint.  There are many causes for joint pain. These include:  · Injury  · Osteoarthritis (wearing out of the joint surface)  · Gout (inflammation of the joint due to crystals in the joint fluid)  · Infection inside the joint    · Bursitis (inflammation of the fluid-filled sacs around the joint)  · Autoimmune disorders such as rheumatoid arthritis or lupus  · Tendonitis (inflamation of chords that attach muscle to bone)  Home care  · Rest the involved joint(s) until your symptoms improve.   · You may be prescribed pain medication. If none is prescribed, you may use acetaminophen or ibuprofen to control pain and inflammation.  Follow up  Follow up with your healthcare provider or our staff as advised.  When to seek medical care  Contact your healthcare provider right away if any of the following occurs:  · Pain, swelling, or redness of joint increases  · Pain worsens or recurs after a period of improvement  · Pain moves to other joints  · You cannot bear weight on the affected joint   · You cannot move the affected joint  · Joint appears deformed  · New rash appears  · Fever of 101ºF (38.8ºC) or higher, or as directed by your healthcare provider  Date Last Reviewed: 4/26/2015  © 9219-7267 [employer]. [address] [place] [address], [place] [address]. All rights reserved. This information is not intended as a substitute for professional medical care. Always follow your healthcare professional's instructions.

## 2017-08-02 NOTE — PROGRESS NOTES
Urogyn follow up  08/02/2017  .  [facility]  [address] [address]    Elisa Richardson  [id number]  7/29/1927      Elisa Richardson is a 89 y.o. here for a urogyn follow up.    Last HPI from 06/02/217  History of Present Illness: 89 Y O F P3 with history of MI one year ago, with worsening urinary incontinence over the past year referred by Dr. Jalloh for evaluation.      Interval  HP since the last visit   1)  UI:  (+) MAGGI  (+) UUI  - improved but still wears a pad. .  (+) pads:2 times.  Daytime frequency: Q 4 -5 hours.  Nocturia: No:    (-) dysuria-  (--) hematuria,  (--) frequent UTIs.  (+) complete bladder emptying.   2)  POP:  Symptoms:(--)  .  (--) vaginal bleeding. (--) vaginal discharge. (-) sexually active.  (--) dyspareunia.   (--)  Vaginal dryness.  (--) vaginal estrogen use.  Using barrier cream    3)  BM:  (-) constipation/straining.  (--) chronic diarrhea. (--) hematochezia.  (--) fecal incontinence.  (--) fecal smearing/urgency.  (--) incomplete evacuation.            Changes from last visit:  1)  UI:  (+) MAGGI small amount-- worse if bladder full  (+) UUI    (+) pads:1/day, usually minimum wetness.  Daytime frequency: unsure-- waits for urge  Nocturia: Yes: 1/night.   (--) dysuria,  (--) hematuria,  (--) frequent UTIs.  (+) complete bladder emptying.     2)  POP:  Symptoms:(--)  .  (--) vaginal bleeding. (--) vaginal discharge. (-) sexually active.  (--) dyspareunia.   (--)  Vaginal dryness.  (--) vaginal estrogen use.  Using barrier cream      3)  BM:  (-) constipation/straining.  (--) chronic diarrhea. (--) hematochezia.  (--) fecal incontinence.  (--) fecal smearing/urgency.  (--) incomplete evacuation.             Past Medical History:   Diagnosis Date    Anticoagulant long-term use     Anxiety     Aortic atherosclerosis: see CXR 2012 8/1/2017    Arthritis     Calcified granuloma of lung: see CXR 2012 8/1/2017    Chronic rhinitis     Coronary artery disease  involving native coronary artery of native heart without angina pectoris 10/14/2016    Encounter for blood transfusion     Glucose intolerance (impaired glucose tolerance) 2/25/2013    Hyperlipidemia     Hypertension     Osteoporosis     Polyclonal gammopathy     Thyroid disease        Past Surgical History:   Procedure Laterality Date    APPENDECTOMY      EYE SURGERY      HYSTERECTOMY      TONSILLECTOMY         Current Outpatient Prescriptions   Medication Sig    acetaminophen (TYLENOL) 325 MG tablet Take 325 mg by mouth every 6 (six) hours as needed.      alprazolam (XANAX) 0.25 MG tablet TAKE 1 TABLET(0.25 MG) BY MOUTH EVERY NIGHT AS NEEDED FOR ANXIETY    aspirin (ECOTRIN) 81 MG EC tablet Take 1 tablet (81 mg total) by mouth once daily.    atorvastatin (LIPITOR) 40 MG tablet Take 1 tablet (40 mg total) by mouth once daily.    cetirizine (ZYRTEC) 10 MG tablet Take 10 mg by mouth daily as needed.      clopidogrel (PLAVIX) 75 mg tablet Take 1 tablet (75 mg total) by mouth once daily.    cyanocobalamin (VITAMIN B-12) 1000 MCG tablet Take 100 mcg by mouth once daily.    inhalation device (AEROCHAMBER MV) Use as directed for inhalation.    levothyroxine (SYNTHROID) 75 MCG tablet Take 1 tablet (75 mcg total) by mouth once daily.    lisinopril (PRINIVIL,ZESTRIL) 5 MG tablet Take 1 tablet (5 mg total) by mouth 2 (two) times daily.    metoprolol tartrate (LOPRESSOR) 25 MG tablet Take 1 tablet (25 mg total) by mouth 2 (two) times daily.    mirabegron 50 mg Tb24 Take 1 tablet (50 mg total) by mouth once daily.    multivitamin (THERAGRAN) per tablet Take 1 tablet by mouth once daily.    mupirocin calcium 2% (BACTROBAN) 2 % cream Apply topically 2 (two) times daily. Apply to right leg wound.    NASACORT AQ 55 mcg nasal inhaler INHALE 1 TO 2 SPRAYS IN EACH NASAL EVERY DAY    nitroGLYCERIN (NITROSTAT) 0.4 MG SL tablet Place 1 tablet (0.4 mg total) under the tongue every 5 (five) minutes as needed for  Chest pain.    vitamin D 1000 units Tab Take 185 mg by mouth once daily.    clobetasol (TEMOVATE) 0.05 % cream Apply topically twice a week.     No current facility-administered medications for this visit.        ROS:  As per HPI.      Exam  BP (!) 140/70   Wt 80.7 kg (178 lb)   BMI 29.62 kg/m²   General: alert and oriented, no acute distress  Respiratory: normal respiratory effort  Abd: soft, non-tender, non-distended    Pelvic--deferred    Impression  1. Lichen sclerosus  clobetasol (TEMOVATE) 0.05 % cream   2. Mixed stress and urge urinary incontinence     3. Vaginal atrophy     4. Nocturia       We reviewed the above issues and discussed options for short-term versus long-term management of her problems.   Plan:     1.  Mixed urinary incontinence, urge > stress:   --Empty bladder every 3 hours.  Empty well: wait a minute, lean forward on toilet.    --Avoid dietary irritants (see sheet).  Keep diary x 3-5 days to determine your irritants.  --KEGELS: do 10 in AM and 10 in PM, holding each x 10 seconds.  When you feel urge to go, STOP, KEGEL, and when urge has passed, then go to bathroom.  Consider PT in future.    --URGE:continue myrrbetriq to 50 mg daily.  SE profile reviewed.    Takes 2-4 weeks to see if will have effect.      --STRESS:  Pessary vs. Sling vs periurethral injections;does not want to try IC ring     2. Lichens sclerosus:  Itching resolved; much improved   --Clobetasol twice weekly  --Continue to change pads often, use barrier cream twice a day     3. Nocturia (nighttime urination): stop fluids 2 hours before bed.  If have leg swelling:  Elevate feet above chest x 1 hour before bed to get excess fluid off.  Can also use support hose (knee highs).      4. RTC 3 months  30 minutes were spent in face to face time with this patient  75 % of this time was spent in counseling and/or coordination of care    Cindy Child, [facility]  Division of Female Pelvic Medicine and  Reconstructive Surgery  Department of Obstetrics & Gynecology

## 2017-08-02 NOTE — PATIENT INSTRUCTIONS
1.  Mixed urinary incontinence, urge > stress:   --Empty bladder every 3 hours.  Empty well: wait a minute, lean forward on toilet.    --Avoid dietary irritants (see sheet).  Keep diary x 3-5 days to determine your irritants.  --KEGELS: do 10 in AM and 10 in PM, holding each x 10 seconds.  When you feel urge to go, STOP, KEGEL, and when urge has passed, then go to bathroom.  Consider PT in future.    --URGE:continue myrrbetriq to 50 mg daily.  SE profile reviewed.    Takes 2-4 weeks to see if will have effect.      --STRESS:  Pessary vs. Sling vs periurethral injections;does not want to try IC ring     2. Lichens sclerosus:  Itching resolved; much improved   --Clobetasol twice weekly  --Continue to change pads often, use barrier cream twice a day     3. Nocturia (nighttime urination): stop fluids 2 hours before bed.  If have leg swelling:  Elevate feet above chest x 1 hour before bed to get excess fluid off.  Can also use support hose (knee highs).      4. RTC 3 months

## 2017-08-10 ENCOUNTER — OFFICE VISIT (OUTPATIENT)
Dept: ORTHOPEDICS | Facility: CLINIC | Age: 82
End: 2017-08-10
Payer: MEDICARE

## 2017-08-10 VITALS — HEIGHT: 65 IN | BODY MASS INDEX: 29.65 KG/M2 | WEIGHT: 177.94 LBS

## 2017-08-10 DIAGNOSIS — M77.8 RIGHT SHOULDER TENDINITIS: Primary | ICD-10-CM

## 2017-08-10 PROCEDURE — 3008F BODY MASS INDEX DOCD: CPT | Mod: S$GLB,,, | Performed by: NURSE PRACTITIONER

## 2017-08-10 PROCEDURE — 99999 PR PBB SHADOW E&M-EST. PATIENT-LVL III: CPT | Mod: PBBFAC,,, | Performed by: NURSE PRACTITIONER

## 2017-08-10 PROCEDURE — 1125F AMNT PAIN NOTED PAIN PRSNT: CPT | Mod: S$GLB,,, | Performed by: NURSE PRACTITIONER

## 2017-08-10 PROCEDURE — 99203 OFFICE O/P NEW LOW 30 MIN: CPT | Mod: 25,S$GLB,, | Performed by: NURSE PRACTITIONER

## 2017-08-10 PROCEDURE — 20610 DRAIN/INJ JOINT/BURSA W/O US: CPT | Mod: RT,S$GLB,, | Performed by: NURSE PRACTITIONER

## 2017-08-10 PROCEDURE — 1159F MED LIST DOCD IN RCRD: CPT | Mod: S$GLB,,, | Performed by: NURSE PRACTITIONER

## 2017-08-10 RX ORDER — TRIAMCINOLONE ACETONIDE 40 MG/ML
40 INJECTION, SUSPENSION INTRA-ARTICULAR; INTRAMUSCULAR
Status: COMPLETED | OUTPATIENT
Start: 2017-08-10 | End: 2017-08-10

## 2017-08-10 RX ADMIN — TRIAMCINOLONE ACETONIDE 40 MG: 40 INJECTION, SUSPENSION INTRA-ARTICULAR; INTRAMUSCULAR at 06:08

## 2017-08-10 NOTE — PROGRESS NOTES
CC: Pain of the Right Shoulder      HPI: Pt with right shoulder pain for the past 6 weeks. The pain started on 6/27/17 when she was in [place] for her daughter's surgery. The pain is located to the anterior shoulder and over the deltoid with radiation to the elbow. She has taken motrin with relief, but she doesn't want to take motrin long term.     ROS  General: denies fever and chills  Resp: no c/o sob  CVS: no c/o cp  MSK: c/o right shoulder pain which is aching and limiting her range of motion    PE  General: AAOx3, pleasant and cooperative  Resp: respirations even and unlabored  MSK: right shoulder exam  + neer  + escobar  + pain with internal rotation    Xray:  Reviewed by me: Visualized osseous structures appear unremarkable, with no evidence of recent or healing fracture, lytic destructive process, or other significant abnormality identified.  No glenohumeral dislocation.  A small soft tissue calcification is seen adjacent to the greater tuberosity of the right humerus, likely lying within the supraspinatus tendon.  Some minimal spurring at the right acromioclavicular joint level is also observed    Assessment:  Right shoulder calcific tendinitis  Right shoulder djd    Plan:  Pt would like to have a cortisone injection in the right shoulder tonight  If no improvement, will try PT  F/u as needed    Shoulder Injection Procedure Note    Pre-operative Diagnosis: right shoulder pain    Post-operative Diagnosis: same    Indications: right shoulder pain    Anesthesia: none    Procedure Details     Verbal consent was obtained for the procedure. The injection site was identified and the skin was prepared with alcohol. The right shoulder was injected from a posterior approach with 1 ml of Kenalog and 5 ml Lidocaine under sterile technique using a 22 gauge 1 1/2 inch needle. The needle was removed and the area cleansed and dressed.    Complications:  None; patient tolerated the procedure well.    she was advised to  rest the shoulder today, using ice as needed for comfort and swelling. she did receive immediate relief of the shoulder pain. she was told this would be short lived and is secondary to the lidocaine. she may have an increase in discomfort tonight followed by steady improvement over the next several days. It may take 1-3 weeks following the injection to get the full benefit of the medication.

## 2017-08-10 NOTE — LETTER
August 10, 2017      Shantel Jalloh MD  [address]  [facility] [address]           [facility]  [address]  [facility] [address]  Phone: [phone]  Fax: [phone]          Patient: Elisa Richardson   MR Number: [id number]   YOB: 1927   Date of Visit: 8/10/2017       Dear Dr. Shantel Jalloh:    Thank you for referring Elisa Richardson to me for evaluation. Attached you will find relevant portions of my assessment and plan of care.    If you have questions, please do not hesitate to call me. I look forward to following Elisa Richardson along with you.    Sincerely,    Laverne Dejesus NP    Enclosure  CC:  No Recipients    If you would like to receive this communication electronically, please contact [email or web address] or [phone] to request more information on [employer] Link access.    For providers and/or their staff who would like to refer a patient to [facility], please contact us through our one-stop-shop provider referral line, [facility], at [phone].    If you feel you have received this communication in error or would no longer like to receive these types of communications, please e-mail [email or web address]

## 2017-08-21 ENCOUNTER — HOSPITAL ENCOUNTER (OUTPATIENT)
Dept: RADIOLOGY | Facility: HOSPITAL | Age: 82
Discharge: HOME | End: 2017-08-21
Attending: NURSE PRACTITIONER
Payer: MEDICARE

## 2017-08-21 ENCOUNTER — OFFICE VISIT (OUTPATIENT)
Dept: CARDIOLOGY | Facility: CLINIC | Age: 82
End: 2017-08-21
Payer: MEDICARE

## 2017-08-21 VITALS
BODY MASS INDEX: 29.96 KG/M2 | HEART RATE: 72 BPM | HEIGHT: 64 IN | DIASTOLIC BLOOD PRESSURE: 78 MMHG | WEIGHT: 175.5 LBS | SYSTOLIC BLOOD PRESSURE: 138 MMHG

## 2017-08-21 DIAGNOSIS — I25.10 CORONARY ARTERY DISEASE INVOLVING NATIVE CORONARY ARTERY OF NATIVE HEART WITHOUT ANGINA PECTORIS: Primary | Chronic | ICD-10-CM

## 2017-08-21 DIAGNOSIS — Z95.5 S/P DRUG ELUTING CORONARY STENT PLACEMENT: ICD-10-CM

## 2017-08-21 DIAGNOSIS — I51.89 DIASTOLIC DYSFUNCTION: Chronic | ICD-10-CM

## 2017-08-21 DIAGNOSIS — I77.9 BILATERAL CAROTID ARTERY DISEASE: Chronic | ICD-10-CM

## 2017-08-21 DIAGNOSIS — I83.12 VARICOSE VEINS OF BOTH LOWER EXTREMITIES WITH INFLAMMATION: Chronic | ICD-10-CM

## 2017-08-21 DIAGNOSIS — I10 ESSENTIAL HYPERTENSION: Chronic | ICD-10-CM

## 2017-08-21 DIAGNOSIS — I70.0 AORTIC ATHEROSCLEROSIS: ICD-10-CM

## 2017-08-21 DIAGNOSIS — E78.2 MIXED HYPERLIPIDEMIA: Chronic | ICD-10-CM

## 2017-08-21 DIAGNOSIS — I70.209 ATHEROSCLEROSIS OF ARTERIES OF EXTREMITIES: ICD-10-CM

## 2017-08-21 DIAGNOSIS — I87.2 VENOUS INSUFFICIENCY (CHRONIC) (PERIPHERAL): ICD-10-CM

## 2017-08-21 DIAGNOSIS — E03.9 ACQUIRED HYPOTHYROIDISM: Chronic | ICD-10-CM

## 2017-08-21 DIAGNOSIS — I83.11 VARICOSE VEINS OF BOTH LOWER EXTREMITIES WITH INFLAMMATION: Chronic | ICD-10-CM

## 2017-08-21 PROCEDURE — 1159F MED LIST DOCD IN RCRD: CPT | Mod: S$GLB,,, | Performed by: NURSE PRACTITIONER

## 2017-08-21 PROCEDURE — 99999 PR PBB SHADOW E&M-EST. PATIENT-LVL III: CPT | Mod: PBBFAC,,, | Performed by: NURSE PRACTITIONER

## 2017-08-21 PROCEDURE — 1125F AMNT PAIN NOTED PAIN PRSNT: CPT | Mod: S$GLB,,, | Performed by: NURSE PRACTITIONER

## 2017-08-21 PROCEDURE — 71020 XR CHEST PA AND LATERAL: CPT | Mod: TC

## 2017-08-21 PROCEDURE — 99499 UNLISTED E&M SERVICE: CPT | Mod: S$GLB,,, | Performed by: NURSE PRACTITIONER

## 2017-08-21 PROCEDURE — 3008F BODY MASS INDEX DOCD: CPT | Mod: S$GLB,,, | Performed by: NURSE PRACTITIONER

## 2017-08-21 PROCEDURE — 99214 OFFICE O/P EST MOD 30 MIN: CPT | Mod: S$GLB,,, | Performed by: NURSE PRACTITIONER

## 2017-08-21 PROCEDURE — 71020 XR CHEST PA AND LATERAL: CPT | Mod: 26,,, | Performed by: RADIOLOGY

## 2017-08-21 NOTE — PROGRESS NOTES
Ms. Richardson is a patient of Dr. Corea and was last seen in [facility] 5/22/2017.    Subjective:   Patient ID:  Elisa Richardson is a 89 y.o. female who presents for follow-up of Follow-up    Problems:  CAD s/p THONY distal RCA 9/2016  HLD  HTN  Diastolic dysfunction, EF 60-65%  Hypothyroidism  Varicose veins  Venous insufficiency  Carotid artery diseases, 0-19% bilateral, mild    HPI  Ms. Richardson is in clinic today for routine follow up.  Patient denies chest pain with exertion or at rest, palpitations, SOB, CURRY, dizziness, syncope, edema, orthopnea, PND, or claudication.  Reports routine exercise.  She is walking for 30-40 minutes in [employer] and [employer] without any chest pain.  CAD is treated with low dose ASA and high intensity statin.  She is taking dual anti-platelet therapy since her stent placement in 9/2016.  Denies bleeding gums, epistaxis, hematuria, and hematochezia.  Her DAPT score is 0 and she will be 1 year post-THONY on 9/23/17.  On 9/23/16, she had a THONY to the distal RCA and angioplasty of the ostial PDA and the proximal PLB.  Stents were not placed in the PDA or PLB to prevent stent jailing.  Hyperlipidemia is treated with atorvastatin and LDL is 56.  Hypertension is treated with lisinopril 5mg and metoprolol succinate 25mg.  Reports following a low salt diet. Home blood pressure readings are 130s/70-80s.    Review of Systems   Constitution: Negative for decreased appetite, diaphoresis, weakness, malaise/fatigue, weight gain and weight loss.   HENT: Negative for headaches.    Eyes: Negative for visual disturbance.   Cardiovascular: Negative for chest pain, claudication, dyspnea on exertion, irregular heartbeat, leg swelling, near-syncope, orthopnea, palpitations, paroxysmal nocturnal dyspnea and syncope.        Denies chest pressure   Respiratory: Negative for cough, hemoptysis, shortness of breath, sleep disturbances due to breathing and snoring.    Endocrine: Negative for cold intolerance and heat  intolerance.   Hematologic/Lymphatic: Negative for bleeding problem. Does not bruise/bleed easily.   Musculoskeletal: Negative for myalgias.   Gastrointestinal: Negative for bloating, abdominal pain, anorexia, change in bowel habit, constipation, diarrhea, nausea and vomiting.   Neurological: Negative for difficulty with concentration, disturbances in coordination, excessive daytime sleepiness, dizziness, light-headedness, loss of balance and numbness.   Psychiatric/Behavioral: The patient does not have insomnia.      Allergies and current medications updated and reviewed:  Review of patient's allergies indicates:  No Known Allergies  Current Outpatient Prescriptions   Medication Sig    acetaminophen (TYLENOL) 325 MG tablet Take 325 mg by mouth every 6 (six) hours as needed.      alprazolam (XANAX) 0.25 MG tablet TAKE 1 TABLET(0.25 MG) BY MOUTH EVERY NIGHT AS NEEDED FOR ANXIETY    aspirin (ECOTRIN) 81 MG EC tablet Take 1 tablet (81 mg total) by mouth once daily.    atorvastatin (LIPITOR) 40 MG tablet Take 1 tablet (40 mg total) by mouth once daily.    cetirizine (ZYRTEC) 10 MG tablet Take 10 mg by mouth daily as needed.      clopidogrel (PLAVIX) 75 mg tablet Take 1 tablet (75 mg total) by mouth once daily.    cyanocobalamin (VITAMIN B-12) 1000 MCG tablet Take 100 mcg by mouth once daily.    inhalation device (AEROCHAMBER MV) Use as directed for inhalation.    levothyroxine (SYNTHROID) 75 MCG tablet Take 1 tablet (75 mcg total) by mouth once daily.    lisinopril (PRINIVIL,ZESTRIL) 5 MG tablet Take 1 tablet (5 mg total) by mouth 2 (two) times daily.    metoprolol tartrate (LOPRESSOR) 25 MG tablet Take 1 tablet (25 mg total) by mouth 2 (two) times daily.    mirabegron 50 mg Tb24 Take 1 tablet (50 mg total) by mouth once daily.    multivitamin (THERAGRAN) per tablet Take 1 tablet by mouth once daily.    mupirocin calcium 2% (BACTROBAN) 2 % cream Apply topically 2 (two) times daily. Apply to right leg  "wound.    NASACORT AQ 55 mcg nasal inhaler INHALE 1 TO 2 SPRAYS IN EACH NASAL EVERY DAY    nitroGLYCERIN (NITROSTAT) 0.4 MG SL tablet Place 1 tablet (0.4 mg total) under the tongue every 5 (five) minutes as needed for Chest pain.    vitamin D 1000 units Tab Take 185 mg by mouth once daily.    clobetasol (TEMOVATE) 0.05 % cream Apply topically twice a week.     No current facility-administered medications for this visit.        Objective:     Right Arm BP - Sittin/74 (17)  Left Arm BP - Sittin/78 (17)    /78   Pulse 72   Ht 5' 4" (1.626 m)   Wt 79.6 kg (175 lb 7.8 oz)   BMI 30.12 kg/m²     Physical Exam   Constitutional: She is oriented to person, place, and time. Vital signs are normal. She appears well-developed and well-nourished. She is active. No distress.   HENT:   Head: Normocephalic and atraumatic.   Eyes: Conjunctivae and lids are normal. No scleral icterus.   Neck: Neck supple. Normal carotid pulses, no hepatojugular reflux and no JVD present. Carotid bruit is not present.   Cardiovascular: Normal rate, regular rhythm, S1 normal, S2 normal and intact distal pulses.  PMI is not displaced.  Exam reveals gallop and S4. Exam reveals no friction rub.    Murmur heard.   Harsh midsystolic murmur is present with a grade of 1/6  at the upper right sternal border radiating to the neck  Pulses:       Carotid pulses are 2+ on the right side, and 2+ on the left side.       Radial pulses are 2+ on the right side, and 2+ on the left side.        Dorsalis pedis pulses are 2+ on the right side, and 2+ on the left side.        Posterior tibial pulses are 1+ on the right side, and 1+ on the left side.   Mild aortic sclerosis explains the aortic flow murmur   Pulmonary/Chest: Effort normal and breath sounds normal. No respiratory distress. She has no decreased breath sounds. She has no wheezes. She has no rhonchi. She has no rales. She exhibits no tenderness.   Basilar crackles " do not clear with cough   Abdominal: Soft. Normal appearance and bowel sounds are normal. She exhibits no distension, no fluid wave, no abdominal bruit, no ascites and no pulsatile midline mass. There is no hepatosplenomegaly. There is no tenderness.   Musculoskeletal: She exhibits no edema.   Neurological: She is alert and oriented to person, place, and time. Gait normal.   Skin: Skin is warm, dry and intact. No rash noted. She is not diaphoretic. Nails show no clubbing.   Psychiatric: She has a normal mood and affect. Her speech is normal and behavior is normal. Judgment and thought content normal. Cognition and memory are normal.   Nursing note and vitals reviewed.      Chemistry        Component Value Date/Time     04/04/2017 0950    K 4.5 04/04/2017 0950     04/04/2017 0950    CO2 22 (L) 04/04/2017 0950    BUN 20 04/04/2017 0950    CREATININE 0.8 04/04/2017 0950    GLU 96 04/04/2017 0950        Component Value Date/Time    CALCIUM 9.3 04/04/2017 0950    ALKPHOS 68 04/04/2017 0950    AST 53 (H) 04/04/2017 0950    ALT 33 04/04/2017 0950    BILITOT 0.6 04/04/2017 0950    ESTGFRAFRICA >60.0 04/04/2017 0950    EGFRNONAA >60.0 04/04/2017 0950        Lab Results   Component Value Date    HGBA1C 6.4 (H) 04/04/2017       Recent Labs  Lab 09/22/16 2055 04/04/17  0950 06/06/17  0810   WHITE BLOOD CELL COUNT 15.13 H  < > 7.15 6.71   HEMOGLOBIN 14.4  < > 14.3 13.7   HEMATOCRIT 41.6  < > 42.6 40.6   MCV 90  < > 92 93   PLATELETS 202  < > 187 182    H  --  140 H  --    TSH  --   --  1.498  --    CHOLESTEROL  --   < > 104 L 121   HDL  --   < > 43 49   LDL CHOLESTEROL  --   < > 42.4 L 56.2 L   TRIGLYCERIDES  --   < > 93 79   HDL/CHOLESTEROL RATIO  --   < > 41.3 40.5   < > = values in this interval not displayed.      Recent Labs  Lab 09/22/16 2055   INR 1.2        Test(s) Reviewed  I have reviewed the following in detail:  [x] Stress test   [] Angiography   [x] Echocardiogram   [x] Labs   [] Other:          Assessment/Plan:     Coronary artery disease involving native coronary artery of native heart without angina pectoris  Comments:  Taking GDMT and DAPT. Stop Plavix 9/23/17 after 12 months Rx post THONY. Continue GDMT    Bilateral carotid artery disease: 0-19% bilateral 4/17  Comments:  Mild disease. No bruit on exam. No intervention at this time needed.    Atherosclerosis of arteries of extremities    Aortic atherosclerosis: see CXR 2012    Mixed hyperlipidemia  Comments:  LDL <70 at goal. Continue atorvastatin 40mg    Essential hypertension  Comments:  Well controlled blood pressure. Continue current regimen.     Varicose veins of both lower extremities with inflammation  Comments:  Wearing support stocking. Continue wearing support stockings    Venous insufficiency (chronic) (peripheral)    Acquired hypothyroidism  Comments:  Last TSH wnl. Follow up with PCP as needed.    Diastolic dysfunction  Comments:  2gm sodium diet. Daily weights.  Orders:  -     X-Ray Chest PA And Lateral; Future; Expected date: 08/21/2017    S/P drug eluting coronary stent placement        Return in about 6 months (around 2/21/2018).

## 2017-08-21 NOTE — PATIENT INSTRUCTIONS
Restrict salt to no more than 2,000mg a day  -No more than 100mg of salt in a snack  -No more than 250mg of salt in an entree  -No more than 500mg of salt in an entire meal    Increase cardiovascular exercise to 30 minutes of brisk walking a day for 4-5 days a week.  You can use a stationary bike or swim for 30 minutes a day instead of walking.  Whatever exercise you choose, make sure you are working hard enough to increase your heart rate.     Stop taking the plavix (clopidogrel) on 9/23/17, when you will have completed therapy.    Weigh yourself daily.  Notify cardiology if you gain 3 pounds in a day or 5 pounds in a week.     Understanding Carbohydrates    A car needs the right type of fuel to run. And you need the right kind of food to function. To keep your energy level up, your body needs food that has carbohydrates. But carbohydrates raise blood sugar levels higher and faster than other kinds of food. Your dietitian will work with you to figure out the amount of carbohydrates you need.  Starches  Starches are found in grains, some vegetables, and beans. Grain products include bread, pasta, cereal, and tortillas. Starchy vegetables include potatoes, peas, corn, lima beans, yams, and squash. Kidney beans, tomlin beans, black beans, garbanzo beans, and lentils also contain starches.  Sugars  Sugars are found naturally in many foods. Or sugar can be added. Foods that contain natural sugar include fruits and fruit juices, dairy products, honey, and molasses. Added sugars are found in most desserts, processed foods, candy, regular soda, and fruit drinks. These are very helpful for treating low blood sugar, or hypoglycemia. They provide sugar quickly. Try to keep at least 15 to 20 grams of these simple sugars with you at all times. Eat this if you begin to have low blood sugar symptoms.  Fiber  Fiber comes from plant foods. Most fiber isnt digested by the body. Instead of raising blood sugar levels like other  carbohydrates, it actually stops blood sugar from rising too fast. Fiber is found in fruits, vegetables, whole grains, beans, peas, and many nuts.  Carb counting  Keep track of the amount of carbohydrates you eat. This can help you keep the right balance of physical activity and medicine. The amount of carbohydrates needed will vary for each person. It depends on many things such as your health, the medicines you take, and how active you are. Your healthcare team will help you figure out the right amount of carbohydrates for you. You may start with around 45 to 60 grams of carbohydrate per meal, depending on your situation. Carb counting is a system that helps you keep track of the carbohydrates you eat at each meal.  Carbohydrates come from a variety of foods. These include grains, starchy vegetables, fruit, milk, beans, and snack foods. You can either count carbohydrate grams or carbohydrate servings. When you count carbohydrate servings, 1 carbohydrate serving = 15 grams of carbohydrates.  Here are some examples of foods containing about 15 grams of carbohydrates (1 serving of carbohydrates):  · 1/2 cup of canned or frozen fruit  · A small piece of fresh fruit (4 ounces)  · 1 slice of bread  · 1/2 cup of oatmeal  · 1/3 cup of rice  · 4 to 6 crackers  · 1/2 English muffin  · 1/2 cup of black beans  · 1/4 of a large baked potato (3 ounces)  · 2/3 cup of plain fat-free yogurt  · 1 cup of soup  · 1/2 cup of casserole  · 6 chicken nuggets  · 2-inch-square brownie or cake without frosting  · 2 small cookies  · 1/2 cup of ice cream or sherbet  Carb counting is easier when food labels are available. Look at the label to see how many grams of total carbohydrates the food contains. Then you can figure out how much you should eat.  Two very important lines to look at on the label are the serving size and the total carbohydrate amount. Here are some tips for using food labels to count your carbohydrate intake:  · Check the  serving size. The information on the label is based on that serving size. If you eat more than the listed serving size, you may have to double or triple the other information on the label.   · Check the total grams of carbohydrates. Total carbohydrate from the label includes sugar, starch, and fiber. Be sure to use the total carbohydrate number and not sugar alone.  · Know how many grams of carbohydrates you can have.  Be familiar with the matching portion sizes.  · Compare labels. Compare the labels of different products, looking at serving sizes and total carbohydrates to find the products that work best for you.   · Don't forget protein and fat. With all the focus on carb counting, it might be easy to forget protein and fat in your meals. Don't forget to include sources of protein and healthy fat to balance your meals.  Its also important to be consistent with the amount and time you eat when taking a fixed dose of diabetes medicine. Work with your healthcare provider or dietitian if you need additional help. He or she can help you keep track of your carbohydrate intake. He or she can also help you figure out how many grams of carbohydrates you should have.  Date Last Reviewed: 7/1/2016  © 1704-5496 [employer]. [address] [place] [address], [place] [address]. All rights reserved. This information is not intended as a substitute for professional medical care. Always follow your healthcare professional's instructions.        Diabetes: Learning About Serving and Portion Sizes     A good rule of thumb: Devote half your plate to vegetables and green salad. Split the other half between protein and starchy carbohydrates. Fruit makes a good dessert.     Servings and portions. Whats the difference? These terms can be very confusing. But learning to measure serving sizes can help you figure out how many carbohydrates (carbs) and other foods you eat each day. They are also powerful tools for managing your  weight.  Servings and portions  Many different words are used to describe amounts of food. If your health care provider uses a term youre not sure of, dont be afraid to ask. It helps to know the difference between servings and portions:  · A serving size is a fixed size. Food producers use this term to describe their products. For example, the label on a cereal box could say that 1 cup of dry cereal = 1 serving.  · A portion (also called a helping) is how much you eat or how much you put on your plate at a meal. For example, you might eat 2 cups of cereal at breakfast.  Using serving information  The portion you choose to eat (such as 2 cups of cereal) may be more than one serving as listed on the food label (such as 1 cup of cereal). Thats why it helps to measure or weigh the food you eat. Because the food label values are based on servings, youll need to know how many servings you eat at one sitting.     Ounces: 2 to 3 ounces is about the size of your palm.       1 Cup: 1 cup (or a medium-sized piece) is about the size of your fist.       1/2 Cup: 1/2 cup is about the size of your cupped hand.      One tablespoon is about the size of your thumb.  One teaspoon is about the size of the tip of your thumb.  Keeping track of serving sizes  When youre planning for a snack or a meal, keep servings in mind. If you dont have measuring cups or a scale handy, there are ways to eyeball serving sizes, such as comparing your food to the size of your hand (see pictures above).  Managing portion sizes  If your weight is a concern, reducing your portions can help. You can eat more than one serving of a food at once. But to keep from eating too much at one meal, learn how to manage your portions. A portion is the amount of each type of food on your plate. See the plate diagram for an example of balanced portions.  Date Last Reviewed: 3/1/2016  © 5351-9327 [employer]. [address] [place] [address], [place]  [address]. All rights reserved. This information is not intended as a substitute for professional medical care. Always follow your healthcare professional's instructions.        Understanding Carbohydrates, Fats, and Protein  Food is a source of fuel and nourishment for your body. Its also a source of pleasure. Having diabetes doesnt mean you have to eat special foods or give up desserts. Instead, your dietitian can show you how to plan meals to suit your body. To start, learn how different foods affect blood sugar.  Carbohydrates  Carbohydrates are the main source of fuel for the body. Carbohydrates raise blood sugar. Many people think carbohydrates are only found in pasta or bread. But carbohydrates are actually in many kinds of foods:  · Sugars occur naturally in foods such as fruit, milk, honey, and molasses. Sugars can also be added to many foods, from cereals and yogurt to candy and desserts. Sugars raise blood sugar.  · Starches are found in bread, cereals, pasta, and dried beans. Theyre also found in corn, peas, potatoes, yam, acorn squash, and butternut squash. Starches also raise blood sugar.   · Fiber is found in foods such as vegetables, fruits, beans, and whole grains. Unlike other carbs, fiber isnt digested or absorbed. So it doesnt raise blood sugar. In fact, fiber can help keep blood sugar from rising too fast. It also helps keep blood cholesterol at a healthy level.  Did you know?  Even though carbohydrates raise blood sugar, its best to have some in every meal. They are an important part of a healthy diet.   Fat  Fat is an energy source that can be stored until needed. Fat does not raise blood sugar. However, it can raise blood cholesterol, increasing the risk of heart disease. Fat is also high in calories, which can cause weight gain. Not all types of fat are the same.  More Healthy:  · Monounsaturated fats are mostly found in vegetable oils, such as olive, canola, and peanut oils. They are also  found in avocados and some nuts. Monounsaturated fats are healthy for your heart. Thats because they lower LDL (unhealthy) cholesterol.  · Polyunsaturated fats are mostly found in vegetable oils, such as corn, safflower, and soybean oils. They are also found in some seeds, nuts, and fish. Polyunsaturated fats lower LDL (unhealthy) cholesterol. So, choosing them instead of saturated fats is healthy for your heart. Certain unsaturated fats can help lower triglycerides.   Less Healthy:  · Saturated fats are found in animal products, such as meat, poultry, whole milk, lard, and butter. Saturated fats raise LDL cholesterol and are not healthy for your heart.  · Hydrogenated oils and trans fats are formed when vegetable oils are processed into solid fats. They are found in many processed foods. Hydrogenated oils and trans fats raise LDL cholesterol and lower HDL (healthy) cholesterol. They are not healthy for your heart.  Protein  Protein helps the body build and repair muscle and other tissue. Protein has little or no effect on blood sugar. However, many foods that contain protein also contain saturated fat. By choosing low-fat protein sources, you can get the benefits of protein without the extra fat:  · Plant protein is found in dry beans and peas, nuts, and soy products, such as tofu and soymilk. These sources tend to be cholesterol-free and low in saturated fat.  · Animal protein is found in fish, poultry, meat, cheese, milk, and eggs. These contain cholesterol and can be high in saturated fat. Aim for lean, lower-fat choices.  Date Last Reviewed: 3/1/2016  © 0661-9056 [employer]. [address] [place] [address], [place] [address]. All rights reserved. This information is not intended as a substitute for professional medical care. Always follow your healthcare professional's instructions.

## 2017-08-22 ENCOUNTER — TELEPHONE (OUTPATIENT)
Dept: RESEARCH | Facility: HOSPITAL | Age: 82
End: 2017-08-22

## 2017-09-29 ENCOUNTER — OFFICE VISIT (OUTPATIENT)
Dept: URGENT CARE | Facility: CLINIC | Age: 82
End: 2017-09-29
Payer: MEDICARE

## 2017-09-29 VITALS
HEIGHT: 64 IN | TEMPERATURE: 98 F | RESPIRATION RATE: 18 BRPM | OXYGEN SATURATION: 96 % | WEIGHT: 175 LBS | SYSTOLIC BLOOD PRESSURE: 171 MMHG | HEART RATE: 86 BPM | BODY MASS INDEX: 29.88 KG/M2 | DIASTOLIC BLOOD PRESSURE: 75 MMHG

## 2017-09-29 DIAGNOSIS — L03.115 CELLULITIS OF RIGHT LOWER EXTREMITY: Primary | ICD-10-CM

## 2017-09-29 DIAGNOSIS — T14.8XXA ABRASION: ICD-10-CM

## 2017-09-29 PROCEDURE — 1159F MED LIST DOCD IN RCRD: CPT | Mod: S$GLB,,, | Performed by: EMERGENCY MEDICINE

## 2017-09-29 PROCEDURE — 99214 OFFICE O/P EST MOD 30 MIN: CPT | Mod: S$GLB,,, | Performed by: EMERGENCY MEDICINE

## 2017-09-29 PROCEDURE — 3008F BODY MASS INDEX DOCD: CPT | Mod: S$GLB,,, | Performed by: EMERGENCY MEDICINE

## 2017-09-29 RX ORDER — CLINDAMYCIN HYDROCHLORIDE 300 MG/1
300 CAPSULE ORAL EVERY 8 HOURS
Qty: 30 CAPSULE | Refills: 0 | Status: SHIPPED | OUTPATIENT
Start: 2017-09-29 | End: 2017-10-03 | Stop reason: SINTOL

## 2017-09-29 RX ORDER — MUPIROCIN 20 MG/G
OINTMENT TOPICAL
Qty: 22 G | Refills: 1 | Status: SHIPPED | OUTPATIENT
Start: 2017-09-29

## 2017-09-29 NOTE — PROGRESS NOTES
"Subjective:       Patient ID: Elisa Richardson is a 89 y.o. female.    Vitals:  height is 5' 4" (1.626 m) and weight is 79.4 kg (175 lb). Her oral temperature is 98 °F (36.7 °C). Her blood pressure is 171/75 (abnormal) and her pulse is 86. Her respiration is 18 and oxygen saturation is 96%.     Chief Complaint: Knee Pain    Pt fell on Tuesday night on her gravel driveway, pt has a wound to her right knee. Pt is concerned that the wound is infected. Area around her knee is red and swollen. DOES NOT HAVE ANY BONY PAIN, NO NUMBNESS, NO TINGLING, NO FEVER. REPORTS SHE HAS BEEN CLEANING WITH H202 AND HIBICLENS HOWEVER WOUND NOT GETTING BETTER AND THE SURROUNDING PAIN AND REDNESS ACTUALLY WORSENIG AND SHE WANTED ABX      Knee Pain    The incident occurred 3 to 5 days ago. The incident occurred at the gym. The injury mechanism was a fall. The pain is present in the right knee. The quality of the pain is described as burning. The pain is at a severity of 3/10. The pain is mild. The pain has been intermittent since onset. Pertinent negatives include no numbness. It is unknown if a foreign body is present. She has tried nothing for the symptoms.     Review of Systems   Constitution: Negative for weakness and malaise/fatigue.   HENT: Negative for nosebleeds.    Cardiovascular: Negative for chest pain and syncope.   Respiratory: Negative for shortness of breath.    Skin: Positive for color change and poor wound healing.        RIGHT ANTERIOR KNEE ABRASION, INFECTED, NO ABSCESS, SURROUNDING PAIN AND CELLULITIS   Musculoskeletal: Positive for joint pain and joint swelling. Negative for back pain and neck pain.   Gastrointestinal: Negative for abdominal pain.   Genitourinary: Negative for hematuria.   Neurological: Negative for dizziness and numbness.       Objective:      Physical Exam   Constitutional: She is oriented to person, place, and time. She appears well-developed and well-nourished.   HENT:   Head: Normocephalic and " atraumatic.   Eyes: EOM are normal. Pupils are equal, round, and reactive to light.   Neck: Normal range of motion.   Cardiovascular: Normal rate, regular rhythm and normal heart sounds.    Pulmonary/Chest: Effort normal and breath sounds normal.   Abdominal: Soft.   Musculoskeletal: Normal range of motion. She exhibits tenderness.   Neurological: She is alert and oriented to person, place, and time. No sensory deficit.   Skin: No rash noted. There is erythema. No pallor.   AFFECTED AREA AS BELOW:  RIGHT ANTERIOR KNEE CIRCULAR SCAB 3 X 3 CM AREA OF SCABBED OVER ABRASION, MILD EXCORIATION, OOZING SEROUS FLUID, NO PURULENT DRAINAGE, NO ABSCESS, SURROUNDING CELLULITIS EXTENDING LATERALLY AND DISTALLY, ROM OF THE JOINT NORMAL, NO BONY TTP, DISTALLY NV INTACT   Nursing note and vitals reviewed.      Assessment:       1. Cellulitis of right lower extremity    2. Abrasion        Plan:         Cellulitis of right lower extremity    Abrasion    Other orders  -     mupirocin (BACTROBAN) 2 % ointment; Apply to affected area 3 times daily  Dispense: 22 g; Refill: 1  -     clindamycin (CLEOCIN) 300 MG capsule; Take 1 capsule (300 mg total) by mouth every 8 (eight) hours.  Dispense: 30 capsule; Refill: 0      FOLLOW UP WITH YOUR VASCULAR SURGEON AS PLANNED ON Wednesday FOR WOUND CHECK AND AS PLANNED    CLINDAMYCIN RX  BACTROBAN OINTMENT RX  TYLENOL 650 MG EVERY 6 HOURS FOR PAIN  ELEVATE LEG  GO TO THE ER FOR FEVERS, CHILLS, WORSENING PAIN, OR ANY OTHER CONCERNS OR PROBLEMS

## 2017-09-29 NOTE — PATIENT INSTRUCTIONS
Cellulitis  Cellulitis is an infection of the deep layers of skin. A break in the skin, such as a cut or scratch, can let bacteria under the skin. If the bacteria get to deep layers of the skin, it can be serious. If not treated, cellulitis can get into the bloodstream and lymph nodes. The infection can then spread throughout the body. This causes serious illness.  Cellulitis causes the affected skin to become red, swollen, warm, and sore. The reddened areas have a visible border. An open sore may leak fluid (pus). You may have a fever, chills, and pain.  Cellulitis is treated with antibiotics taken for 7 to 10 days. An open sore may be cleaned and covered with cool wet gauze. Symptoms should get better 1 to 2 days after treatment is started. Make sure to take all the antibiotics for the full number of days until they are gone. Keep taking the medicine even if your symptoms go away.  Home care  Follow these tips:  · Limit the use of the part of your body with cellulitis.   · If the infection is on your leg, keep your leg raised while sitting. This will help to reduce swelling.  · Take all of the antibiotic medicine exactly as directed until it is gone. Do not miss any doses, especially during the first 7 days. Dont stop taking the medicine when your symptoms get better.  · Keep the affected area clean and dry.  · Wash your hands with soap and warm water before and after touching your skin. Anyone else who touches your skin should also wash his or her hands. Don't share towels.  Follow-up care  Follow up with your healthcare provider, or as advised. If your infection does not go away on the first antibiotic, your healthcare provider will prescribe a different one.  When to seek medical advice  Call your healthcare provider right away if any of these occur:  · Red areas that spread  · Swelling or pain that gets worse  · Fluid leaking from the skin (pus)  · Fever higher of 100.4º F (38.0º C) or higher after 2 days  on antibiotics  Date Last Reviewed: 9/1/2016  © 7520-8463 [email or web address]. [address] [place] [address], [place] [address]. All rights reserved. This information is not intended as a substitute for professional medical care. Always follow your healthcare professional's instructions.      FOLLOW UP WITH YOUR VASCULAR SURGEON AS PLANNED ON Wednesday FOR WOUND CHECK AND AS PLANNED    CLINDAMYCIN RX  BACTROBAN OINTMENT RX  TYLENOL 650 MG EVERY 6 HOURS FOR PAIN  ELEVATE LEG  GO TO THE ER FOR FEVERS, CHILLS, WORSENING PAIN, OR ANY OTHER CONCERNS OR PROBLEMS

## 2017-10-03 ENCOUNTER — LAB VISIT (OUTPATIENT)
Dept: LAB | Facility: HOSPITAL | Age: 82
End: 2017-10-03
Attending: INTERNAL MEDICINE
Payer: MEDICARE

## 2017-10-03 ENCOUNTER — OFFICE VISIT (OUTPATIENT)
Dept: URGENT CARE | Facility: CLINIC | Age: 82
End: 2017-10-03
Payer: MEDICARE

## 2017-10-03 VITALS
HEART RATE: 69 BPM | OXYGEN SATURATION: 95 % | HEIGHT: 64 IN | TEMPERATURE: 98 F | WEIGHT: 175 LBS | SYSTOLIC BLOOD PRESSURE: 156 MMHG | RESPIRATION RATE: 18 BRPM | DIASTOLIC BLOOD PRESSURE: 71 MMHG | BODY MASS INDEX: 29.88 KG/M2

## 2017-10-03 DIAGNOSIS — I10 ESSENTIAL HYPERTENSION: ICD-10-CM

## 2017-10-03 DIAGNOSIS — L03.90 CELLULITIS, UNSPECIFIED CELLULITIS SITE: Primary | ICD-10-CM

## 2017-10-03 DIAGNOSIS — R73.02 GLUCOSE INTOLERANCE (IMPAIRED GLUCOSE TOLERANCE): ICD-10-CM

## 2017-10-03 DIAGNOSIS — R79.89 LOW VITAMIN B12 LEVEL: ICD-10-CM

## 2017-10-03 DIAGNOSIS — D47.2 MGUS (MONOCLONAL GAMMOPATHY OF UNKNOWN SIGNIFICANCE): ICD-10-CM

## 2017-10-03 LAB
ALBUMIN SERPL BCP-MCNC: 3.5 G/DL
ALP SERPL-CCNC: 75 U/L
ALT SERPL W/O P-5'-P-CCNC: 37 U/L
ANION GAP SERPL CALC-SCNC: 12 MMOL/L
AST SERPL-CCNC: 52 U/L
BASOPHILS # BLD AUTO: 0.06 K/UL
BASOPHILS NFR BLD: 0.6 %
BILIRUB SERPL-MCNC: 0.7 MG/DL
BUN SERPL-MCNC: 19 MG/DL
CALCIUM SERPL-MCNC: 9.9 MG/DL
CHLORIDE SERPL-SCNC: 104 MMOL/L
CO2 SERPL-SCNC: 25 MMOL/L
CREAT SERPL-MCNC: 0.7 MG/DL
DIFFERENTIAL METHOD: ABNORMAL
EOSINOPHIL # BLD AUTO: 0.5 K/UL
EOSINOPHIL NFR BLD: 4.8 %
ERYTHROCYTE [DISTWIDTH] IN BLOOD BY AUTOMATED COUNT: 14.2 %
EST. GFR  (AFRICAN AMERICAN): >60 ML/MIN/1.73 M^2
EST. GFR  (NON AFRICAN AMERICAN): >60 ML/MIN/1.73 M^2
ESTIMATED AVG GLUCOSE: 123 MG/DL
GLUCOSE SERPL-MCNC: 100 MG/DL
HBA1C MFR BLD HPLC: 5.9 %
HCT VFR BLD AUTO: 41.6 %
HGB BLD-MCNC: 14 G/DL
LYMPHOCYTES # BLD AUTO: 2.5 K/UL
LYMPHOCYTES NFR BLD: 24.8 %
MCH RBC QN AUTO: 31.7 PG
MCHC RBC AUTO-ENTMCNC: 33.7 G/DL
MCV RBC AUTO: 94 FL
MONOCYTES # BLD AUTO: 0.9 K/UL
MONOCYTES NFR BLD: 9.4 %
NEUTROPHILS # BLD AUTO: 6 K/UL
NEUTROPHILS NFR BLD: 60.1 %
PLATELET # BLD AUTO: 206 K/UL
PMV BLD AUTO: 11.3 FL
POTASSIUM SERPL-SCNC: 4.5 MMOL/L
PROT SERPL-MCNC: 7.5 G/DL
RBC # BLD AUTO: 4.41 M/UL
SODIUM SERPL-SCNC: 141 MMOL/L
VIT B12 SERPL-MCNC: 1705 PG/ML
WBC # BLD AUTO: 10.01 K/UL

## 2017-10-03 PROCEDURE — 85025 COMPLETE CBC W/AUTO DIFF WBC: CPT

## 2017-10-03 PROCEDURE — 84165 PROTEIN E-PHORESIS SERUM: CPT

## 2017-10-03 PROCEDURE — 36415 COLL VENOUS BLD VENIPUNCTURE: CPT

## 2017-10-03 PROCEDURE — 84165 PROTEIN E-PHORESIS SERUM: CPT | Mod: 26,,, | Performed by: PATHOLOGY

## 2017-10-03 PROCEDURE — 83036 HEMOGLOBIN GLYCOSYLATED A1C: CPT

## 2017-10-03 PROCEDURE — 82607 VITAMIN B-12: CPT

## 2017-10-03 PROCEDURE — 80053 COMPREHEN METABOLIC PANEL: CPT

## 2017-10-03 PROCEDURE — 99213 OFFICE O/P EST LOW 20 MIN: CPT | Mod: S$GLB,,, | Performed by: PHYSICIAN ASSISTANT

## 2017-10-03 RX ORDER — DOXYCYCLINE 100 MG/1
100 CAPSULE ORAL DAILY
Qty: 10 CAPSULE | Refills: 0 | Status: SHIPPED | OUTPATIENT
Start: 2017-10-03 | End: 2017-10-13

## 2017-10-03 NOTE — PROGRESS NOTES
"Subjective:       Patient ID: Elisa Richardson is a 89 y.o. female.    Vitals:  height is 5' 4" (1.626 m) and weight is 79.4 kg (175 lb). Her temperature is 98.3 °F (36.8 °C). Her blood pressure is 156/71 (abnormal) and her pulse is 69. Her respiration is 18 and oxygen saturation is 95%.     Chief Complaint: Allergic Reaction (To clindamycin )    Pt returns, for wound check. Was given clindamycin for cellulitis 4 days ago but stopped taking it after 2 days due to side effect of diarrhea. Pt reports increased redness to the right knee and pain.  Denies any discharge from the wound, chills or fever.       Allergic Reaction   This is a new problem. The current episode started today. The problem is unchanged. The problem is mild. Associated symptoms include diarrhea and a rash. Pertinent negatives include no abdominal pain, chest pain, itching or vomiting. The treatment provided no relief. There is no swelling present.     Review of Systems   Constitution: Negative for chills and fever.   HENT: Negative for sore throat.    Eyes: Negative for blurred vision.   Cardiovascular: Negative for chest pain.   Respiratory: Negative for shortness of breath.    Skin: Positive for rash. Negative for itching.   Musculoskeletal: Negative for back pain and joint pain.   Gastrointestinal: Positive for diarrhea. Negative for abdominal pain, nausea and vomiting.   Neurological: Negative for headaches.   Psychiatric/Behavioral: The patient is not nervous/anxious.        Objective:      Physical Exam   Constitutional: She appears well-developed and well-nourished.   HENT:   Head: Normocephalic and atraumatic.   Eyes: Conjunctivae are normal.   Cardiovascular: Normal rate, regular rhythm and normal heart sounds.    Pulmonary/Chest: Effort normal and breath sounds normal.   Skin: Skin is warm and dry. There is erythema.   There is an abrasion to the anterior knee just distal to the patella, no swelling, mild erythema and increased warmth " surround the wound edges and extending medially.       Assessment:       1. Cellulitis, unspecified cellulitis site        Plan:         Cellulitis, unspecified cellulitis site  -     doxycycline (VIBRAMYCIN) 100 MG Cap; Take 1 capsule (100 mg total) by mouth once daily.  Dispense: 10 capsule; Refill: 0

## 2017-10-03 NOTE — PATIENT INSTRUCTIONS
Discontinue clindamycin, start doxycycline. Follow up with your primary care provider or return to this clinic for any increased swelling, redness, chills or fever

## 2017-10-04 ENCOUNTER — PATIENT MESSAGE (OUTPATIENT)
Dept: INTERNAL MEDICINE | Facility: CLINIC | Age: 82
End: 2017-10-04

## 2017-10-04 LAB
ALBUMIN SERPL ELPH-MCNC: 3.63 G/DL
ALPHA1 GLOB SERPL ELPH-MCNC: 0.38 G/DL
ALPHA2 GLOB SERPL ELPH-MCNC: 1.01 G/DL
B-GLOBULIN SERPL ELPH-MCNC: 0.97 G/DL
GAMMA GLOB SERPL ELPH-MCNC: 1.12 G/DL
PATHOLOGIST INTERPRETATION SPE: NORMAL
PROT SERPL-MCNC: 7.1 G/DL

## 2017-10-04 RX ORDER — ATORVASTATIN CALCIUM 40 MG/1
TABLET, FILM COATED ORAL
Qty: 90 TABLET | Refills: 0 | OUTPATIENT
Start: 2017-10-04

## 2017-10-04 RX ORDER — METOPROLOL TARTRATE 25 MG/1
TABLET, FILM COATED ORAL
Qty: 60 TABLET | Refills: 0 | OUTPATIENT
Start: 2017-10-04

## 2017-10-09 ENCOUNTER — TELEPHONE (OUTPATIENT)
Dept: CARDIOLOGY | Facility: CLINIC | Age: 82
End: 2017-10-09

## 2017-10-10 ENCOUNTER — TELEPHONE (OUTPATIENT)
Dept: INTERNAL MEDICINE | Facility: CLINIC | Age: 82
End: 2017-10-10

## 2017-10-10 ENCOUNTER — LAB VISIT (OUTPATIENT)
Dept: LAB | Facility: HOSPITAL | Age: 82
End: 2017-10-10
Attending: INTERNAL MEDICINE
Payer: MEDICARE

## 2017-10-10 ENCOUNTER — OFFICE VISIT (OUTPATIENT)
Dept: INTERNAL MEDICINE | Facility: CLINIC | Age: 82
End: 2017-10-10
Payer: MEDICARE

## 2017-10-10 ENCOUNTER — IMMUNIZATION (OUTPATIENT)
Dept: INTERNAL MEDICINE | Facility: CLINIC | Age: 82
End: 2017-10-10
Payer: MEDICARE

## 2017-10-10 VITALS
DIASTOLIC BLOOD PRESSURE: 85 MMHG | WEIGHT: 182.13 LBS | SYSTOLIC BLOOD PRESSURE: 138 MMHG | BODY MASS INDEX: 30.34 KG/M2 | HEART RATE: 68 BPM | HEIGHT: 65 IN

## 2017-10-10 DIAGNOSIS — E78.2 MIXED HYPERLIPIDEMIA: ICD-10-CM

## 2017-10-10 DIAGNOSIS — I25.10 CORONARY ARTERY DISEASE INVOLVING NATIVE CORONARY ARTERY OF NATIVE HEART WITHOUT ANGINA PECTORIS: ICD-10-CM

## 2017-10-10 DIAGNOSIS — I10 ESSENTIAL HYPERTENSION: ICD-10-CM

## 2017-10-10 DIAGNOSIS — I25.2 HISTORY OF NON-ST ELEVATION MYOCARDIAL INFARCTION (NSTEMI): Primary | ICD-10-CM

## 2017-10-10 DIAGNOSIS — E04.1 THYROID NODULE: ICD-10-CM

## 2017-10-10 DIAGNOSIS — E03.9 ACQUIRED HYPOTHYROIDISM: ICD-10-CM

## 2017-10-10 DIAGNOSIS — D47.2 MGUS (MONOCLONAL GAMMOPATHY OF UNKNOWN SIGNIFICANCE): ICD-10-CM

## 2017-10-10 DIAGNOSIS — I77.9 BILATERAL CAROTID ARTERY DISEASE: ICD-10-CM

## 2017-10-10 DIAGNOSIS — I87.2 VENOUS INSUFFICIENCY (CHRONIC) (PERIPHERAL): ICD-10-CM

## 2017-10-10 DIAGNOSIS — I70.0 AORTIC ATHEROSCLEROSIS: ICD-10-CM

## 2017-10-10 DIAGNOSIS — E04.1 THYROID NODULE: Primary | ICD-10-CM

## 2017-10-10 DIAGNOSIS — Z95.5 S/P DRUG ELUTING CORONARY STENT PLACEMENT: ICD-10-CM

## 2017-10-10 DIAGNOSIS — I77.1 TORTUOUS AORTA: ICD-10-CM

## 2017-10-10 LAB
ALBUMIN SERPL BCP-MCNC: 3.6 G/DL
ALP SERPL-CCNC: 75 U/L
ALT SERPL W/O P-5'-P-CCNC: 37 U/L
AST SERPL-CCNC: 55 U/L
BILIRUB DIRECT SERPL-MCNC: 0.4 MG/DL
BILIRUB SERPL-MCNC: 0.7 MG/DL
CHOLEST SERPL-MCNC: 135 MG/DL
CHOLEST/HDLC SERPL: 2.2 {RATIO}
HDLC SERPL-MCNC: 62 MG/DL
HDLC SERPL: 45.9 %
LDLC SERPL CALC-MCNC: 52.2 MG/DL
NONHDLC SERPL-MCNC: 73 MG/DL
PROT SERPL-MCNC: 7.7 G/DL
TRIGL SERPL-MCNC: 104 MG/DL
TSH SERPL DL<=0.005 MIU/L-ACNC: 3.09 UIU/ML

## 2017-10-10 PROCEDURE — 99214 OFFICE O/P EST MOD 30 MIN: CPT | Mod: S$GLB,,, | Performed by: INTERNAL MEDICINE

## 2017-10-10 PROCEDURE — 90662 IIV NO PRSV INCREASED AG IM: CPT | Mod: S$GLB,,, | Performed by: INTERNAL MEDICINE

## 2017-10-10 PROCEDURE — 80076 HEPATIC FUNCTION PANEL: CPT

## 2017-10-10 PROCEDURE — 80061 LIPID PANEL: CPT

## 2017-10-10 PROCEDURE — G0008 ADMIN INFLUENZA VIRUS VAC: HCPCS | Mod: S$GLB,,, | Performed by: INTERNAL MEDICINE

## 2017-10-10 PROCEDURE — 99999 PR PBB SHADOW E&M-EST. PATIENT-LVL IV: CPT | Mod: PBBFAC,,, | Performed by: INTERNAL MEDICINE

## 2017-10-10 PROCEDURE — 99499 UNLISTED E&M SERVICE: CPT | Mod: S$GLB,,, | Performed by: INTERNAL MEDICINE

## 2017-10-10 PROCEDURE — 84443 ASSAY THYROID STIM HORMONE: CPT

## 2017-10-10 PROCEDURE — 36415 COLL VENOUS BLD VENIPUNCTURE: CPT

## 2017-10-10 RX ORDER — ASPIRIN 81 MG/1
81 TABLET ORAL DAILY
Qty: 30 TABLET | Refills: 12
Start: 2017-10-10 | End: 2018-10-10

## 2017-10-10 NOTE — TELEPHONE ENCOUNTER
Please let her know that her labs are stable.  I would like to repeat in 3 months time, orders in.  Thank you

## 2017-10-10 NOTE — PROGRESS NOTES
Subjective:       Patient ID: Elisa Richardson is a 89 y.o. female.    Chief Complaint: Follow-up    Follow up multiple issues    In August seen and given a shot in her shoulder, kenalog.  This helped a little.  Still with limited ROM.  Right shoulder calcific tendinitis  If no improvement, PT recommended, she does not want to pursue this.  F/u as needed was also indicated.    She fell on 9/27- just slipped on a wet leaf; no LOC or syncope.  She was seen in  and given clindamycin, but she could not take this due to diarrhea.  She then went back a few days later and was given doxycycline instead.  Redness is better.  She has been keeping it covered.    No further diarrhea.    Seen in general cardiology in August 2017 and told to come back in 6 months.  No syncope or chest pain.  She was taking dual anti-platelet therapy since her stent placement in 9/2016- recall MI 2016..  Denied bleeding gums, epistaxis, hematuria, and hematochezia.  Her DAPT score is 0 and she was 1 year post-THONY on 9/23/17.  On 9/23/16, she had a THONY to the distal RCA and angioplasty of the ostial PDA and the proximal PLB.  Stents were not placed in the PDA or PLB to prevent stent jailing.  Hyperlipidemia is treated with atorvastatin and LDL is 56.  Hypertension is treated with lisinopril 5mg and metoprolol succinate 25mg.  Reports following a low salt diet. Home blood pressure readings are 130s/70-80s.  Now off of Plavix.  She will see them again in February 2018.      Seen by vascular surgery here earlier in 2017.   Continue compression stocking 20-30 mmHg.  Tylenol of break through pain.  Sclerotherapy in event of venous bleeding.  She decided to go see Dr Arcos at .   Told no blood clots, no further treatment recommended but he will see her in 6 months.    Labs acceptable other than AST slightly elevated.   Will monitor closely.  Likely secondary to her lipid meds.    Thyroid to be rechecked.    Tortuous aorta- discussed.  Will get  chest CT.    Due for flu shot.    Still grieving her  but doing as well as can be expected.    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     Venous insufficiency (chronic) (peripheral)     Atherosclerosis of arteries of extremities     Left knee DJD     Thyroid nodules: see u/s 12/14, 6/17     Bilateral carotid artery disease: 0-19% bilateral 4/17     Vitamin D deficiency disease     History of non-ST elevation myocardial infarction (NSTEMI) 2016     Generalized anxiety disorder     Mixed hyperlipidemia     Coronary artery disease involving native coronary artery of native heart without angina pectoris     S/P drug eluting coronary stent placement 9/2016     Diastolic dysfunction     Mixed urge and stress incontinence     Lichen sclerosus     Nocturia     Senile purpura     Non morbid obesity due to excess calories     Aortic atherosclerosis: see CXR 2012     Calcified granuloma of lung: see CXR 2012        Review of Systems   Constitutional: Negative for activity change, appetite change, chills, fatigue and fever.   HENT: Negative for congestion, hearing loss, sinus pressure and sore throat.    Eyes: Negative for visual disturbance.   Respiratory: Negative for cough, shortness of breath and wheezing.    Cardiovascular: Positive for leg swelling. Negative for chest pain and palpitations.        Varicose veins   Gastrointestinal: Negative for abdominal pain, constipation and vomiting.   Genitourinary: Negative for dysuria, frequency, hematuria and vaginal bleeding.   Musculoskeletal: Positive for arthralgias. Negative for gait problem, joint swelling and myalgias.   Skin: Negative for rash.        Superficial laceration RLE   Neurological: Negative for dizziness, weakness,  light-headedness and headaches.   Hematological: Negative for adenopathy. Does not bruise/bleed easily.   Psychiatric/Behavioral: Negative for confusion, hallucinations, sleep disturbance and suicidal ideas.        Still grieving 's death       Objective:      Physical Exam   Constitutional: She is oriented to person, place, and time. She appears well-developed and well-nourished.   HENT:   Head: Normocephalic and atraumatic.   Right Ear: External ear normal.   Left Ear: External ear normal.   Nose: Nose normal.   Mouth/Throat: Oropharynx is clear and moist. No oropharyngeal exudate.   Eyes: Conjunctivae and EOM are normal. No scleral icterus.   Neck: Normal range of motion. Neck supple. No JVD present. Thyromegaly present.   Cardiovascular: Normal rate, regular rhythm, normal heart sounds and intact distal pulses.  Exam reveals no gallop.    No murmur heard.  Pulmonary/Chest: Effort normal and breath sounds normal. No respiratory distress. She has no wheezes.   Abdominal: Soft. Bowel sounds are normal. She exhibits no distension and no mass. There is no tenderness. There is no rebound and no guarding.   Musculoskeletal: Normal range of motion. She exhibits edema. She exhibits no tenderness.   Varicose veins   Lymphadenopathy:     She has no cervical adenopathy.   Neurological: She is alert and oriented to person, place, and time. She displays normal reflexes. No cranial nerve deficit. Coordination normal.   Skin: Skin is warm. No rash noted. No erythema.   Small laceration superficial RLE near knee   Psychiatric: She has a normal mood and affect. Her behavior is normal. Judgment and thought content normal.   Nursing note and vitals reviewed.      Assessment:       1. History of non-ST elevation myocardial infarction (NSTEMI)    2. Mixed hyperlipidemia    3. Coronary artery disease involving native coronary artery of native heart without angina pectoris    4. S/P drug eluting coronary stent placement 9/2016     5. Aortic atherosclerosis: see CXR 2012    6. Venous insufficiency (chronic) (peripheral)    7. Essential hypertension    8. Bilateral carotid artery disease: 0-19% bilateral 4/17    9. Acquired hypothyroidism    10. Tortuous aorta: see CXR 2017    11. MGUS (monoclonal gammopathy of unknown significance)    12. Thyroid nodules: see u/s 12/14; 6/17        Plan:         History of non-ST elevation myocardial infarction (NSTEMI): stable; keep Cardiology follow uo    Mixed hyperlipidemia  -     Lipid panel; Future; Expected date: 10/10/2017  -     Hepatic function panel; Future; Expected date: 10/10/2017    Coronary artery disease involving native coronary artery of native heart without angina pectoris: keep Cardiology follow up    S/P drug eluting coronary stent placement 9/2016    Aortic atherosclerosis: see CXR 2012    Venous insufficiency (chronic) (peripheral): support hose; keep Cardiology follow up    Essential hypertension: stable    Bilateral carotid artery disease: 0-19% bilateral 4/17: stable    Acquired hypothyroidism  -     TSH; Future; Expected date: 10/10/2017    Tortuous aorta: see CXR 2017  -     CTA Chest Non Coronary; Future; Expected date: 10/10/2017    MGUS (monoclonal gammopathy of unknown significance): stable    Thyroid nodules: see u/s 12/14; 6/17: stable    Other orders  -     aspirin (ECOTRIN) 81 MG EC tablet; Take 1 tablet (81 mg total) by mouth once daily.  Dispense: 30 tablet; Refill: 12    Flu shot    I will review all studies and determine further tx depending on findings

## 2017-10-12 ENCOUNTER — HOSPITAL ENCOUNTER (OUTPATIENT)
Dept: RADIOLOGY | Facility: HOSPITAL | Age: 82
Discharge: HOME | End: 2017-10-12
Attending: INTERNAL MEDICINE
Payer: MEDICARE

## 2017-10-12 DIAGNOSIS — I77.1 TORTUOUS AORTA: ICD-10-CM

## 2017-10-12 PROCEDURE — 71275 CT ANGIOGRAPHY CHEST: CPT | Mod: TC

## 2017-10-12 PROCEDURE — 71275 CT ANGIOGRAPHY CHEST: CPT | Mod: 26,,, | Performed by: RADIOLOGY

## 2017-10-12 PROCEDURE — 25500020 PHARM REV CODE 255: Performed by: INTERNAL MEDICINE

## 2017-10-12 RX ADMIN — IOHEXOL 100 ML: 350 INJECTION, SOLUTION INTRAVENOUS at 05:10

## 2017-10-14 DIAGNOSIS — E03.9 ACQUIRED HYPOTHYROIDISM: ICD-10-CM

## 2017-10-15 RX ORDER — LEVOTHYROXINE SODIUM 75 UG/1
TABLET ORAL
Qty: 90 TABLET | Refills: 0 | Status: SHIPPED | OUTPATIENT
Start: 2017-10-15 | End: 2018-01-14 | Stop reason: SDUPTHER

## 2017-10-16 ENCOUNTER — PATIENT MESSAGE (OUTPATIENT)
Dept: INTERNAL MEDICINE | Facility: CLINIC | Age: 82
End: 2017-10-16

## 2017-10-27 ENCOUNTER — LAB VISIT (OUTPATIENT)
Dept: LAB | Facility: HOSPITAL | Age: 82
End: 2017-10-27
Attending: NURSE PRACTITIONER
Payer: MEDICARE

## 2017-10-27 ENCOUNTER — TELEPHONE (OUTPATIENT)
Dept: UROGYNECOLOGY | Facility: CLINIC | Age: 82
End: 2017-10-27

## 2017-10-27 DIAGNOSIS — R30.0 DYSURIA: ICD-10-CM

## 2017-10-27 DIAGNOSIS — R30.0 DYSURIA: Primary | ICD-10-CM

## 2017-10-27 LAB
AMORPH CRY UR QL COMP ASSIST: ABNORMAL
BACTERIA #/AREA URNS AUTO: ABNORMAL /HPF
BILIRUB UR QL STRIP: NEGATIVE
CLARITY UR REFRACT.AUTO: ABNORMAL
COLOR UR AUTO: YELLOW
GLUCOSE UR QL STRIP: NEGATIVE
HGB UR QL STRIP: ABNORMAL
KETONES UR QL STRIP: NEGATIVE
LEUKOCYTE ESTERASE UR QL STRIP: ABNORMAL
MICROSCOPIC COMMENT: ABNORMAL
NITRITE UR QL STRIP: NEGATIVE
PH UR STRIP: 5 [PH] (ref 5–8)
PROT UR QL STRIP: NEGATIVE
RBC #/AREA URNS AUTO: 1 /HPF (ref 0–4)
SP GR UR STRIP: 1 (ref 1–1.03)
SQUAMOUS #/AREA URNS AUTO: 0 /HPF
URN SPEC COLLECT METH UR: ABNORMAL
UROBILINOGEN UR STRIP-ACNC: NEGATIVE EU/DL
WBC #/AREA URNS AUTO: 26 /HPF (ref 0–5)
WBC CLUMPS UR QL AUTO: ABNORMAL

## 2017-10-27 PROCEDURE — 87186 SC STD MICRODIL/AGAR DIL: CPT

## 2017-10-27 PROCEDURE — 81001 URINALYSIS AUTO W/SCOPE: CPT

## 2017-10-27 PROCEDURE — 87086 URINE CULTURE/COLONY COUNT: CPT

## 2017-10-27 PROCEDURE — 87077 CULTURE AEROBIC IDENTIFY: CPT

## 2017-10-27 PROCEDURE — 87088 URINE BACTERIA CULTURE: CPT

## 2017-10-27 NOTE — TELEPHONE ENCOUNTER
----- Message from Camille Finch sent at 10/27/2017  9:48 AM CDT -----  Contact: RQAUEL BOYD [[id number]]  _x  1st Request  _  2nd Request  _  3rd Request        Who: RAQUEL BOYD [[id number]]    Why: patient states she is having burning when urinating, chills, and lower back pain. She states she would like a Rx sent to [facility] [phone]     What Number to Call Back: [phone]    When to Expect a call back: (Before the end of the day)   -- if call after 3:00 call back will be tomorrow.

## 2017-10-27 NOTE — TELEPHONE ENCOUNTER
Called in pt's rx Keflex 500 mg Dis #14, take by mouth twice daily, to [facility] [phone]. Per LESLEY Child.

## 2017-10-27 NOTE — TELEPHONE ENCOUNTER
Pt complains of dysuria and lower back with chills. Informed pt to drop off an urine specimen at the nearest [facility] lab. Pt will be notified once results are in. Pt voiced understanding and call ended.

## 2017-10-29 LAB — BACTERIA UR CULT: NORMAL

## 2017-10-30 DIAGNOSIS — F41.1 GENERALIZED ANXIETY DISORDER: ICD-10-CM

## 2017-10-30 RX ORDER — ALPRAZOLAM 0.25 MG/1
TABLET ORAL
Qty: 30 TABLET | Refills: 0 | Status: SHIPPED | OUTPATIENT
Start: 2017-10-30 | End: 2018-03-06 | Stop reason: SDUPTHER

## 2017-11-09 RX ORDER — CEPHALEXIN 500 MG/1
CAPSULE ORAL
Qty: 14 CAPSULE | Refills: 0 | OUTPATIENT
Start: 2017-11-09

## 2017-11-09 RX ORDER — CLOPIDOGREL BISULFATE 75 MG/1
TABLET ORAL
Qty: 30 TABLET | Refills: 0 | OUTPATIENT
Start: 2017-11-09

## 2017-11-09 RX ORDER — ATORVASTATIN CALCIUM 40 MG/1
TABLET, FILM COATED ORAL
Qty: 90 TABLET | Refills: 0 | OUTPATIENT
Start: 2017-11-09

## 2017-11-13 RX ORDER — ATORVASTATIN CALCIUM 40 MG/1
40 TABLET, FILM COATED ORAL DAILY
Qty: 90 TABLET | Refills: 3 | Status: SHIPPED | OUTPATIENT
Start: 2017-11-13 | End: 2018-11-13

## 2017-11-13 RX ORDER — ATORVASTATIN CALCIUM 40 MG/1
TABLET, FILM COATED ORAL
Qty: 90 TABLET | Refills: 0 | OUTPATIENT
Start: 2017-11-13

## 2017-11-13 NOTE — TELEPHONE ENCOUNTER
----- Message from Simran Tobias sent at 11/13/2017  8:13 AM CST -----  Contact: Self/ [phone]   Type: Rx    Name of medication(s): atorvastatin (LIPITOR) 40 MG tablet     Is this a refill? New rx? Refill     Who prescribed medication? Dr. Jalloh     Pharmacy Name, Phone, & Location: [facility] [address] [place] [address] AT [facility] [phone] (Phone)  [phone] (Fax)    Comments: pt is calling to have a refill on the Rx above. Please call and advise     Thank you

## 2017-11-15 ENCOUNTER — OFFICE VISIT (OUTPATIENT)
Dept: UROGYNECOLOGY | Facility: CLINIC | Age: 82
End: 2017-11-15
Payer: MEDICARE

## 2017-11-15 VITALS
HEIGHT: 64 IN | DIASTOLIC BLOOD PRESSURE: 70 MMHG | WEIGHT: 184.94 LBS | BODY MASS INDEX: 31.57 KG/M2 | SYSTOLIC BLOOD PRESSURE: 160 MMHG

## 2017-11-15 DIAGNOSIS — L90.0 LICHEN SCLEROSUS: ICD-10-CM

## 2017-11-15 DIAGNOSIS — N39.46 MIXED STRESS AND URGE URINARY INCONTINENCE: Primary | ICD-10-CM

## 2017-11-15 DIAGNOSIS — N95.2 VAGINAL ATROPHY: ICD-10-CM

## 2017-11-15 DIAGNOSIS — R35.1 NOCTURIA: ICD-10-CM

## 2017-11-15 PROCEDURE — 99999 PR PBB SHADOW E&M-EST. PATIENT-LVL IV: CPT | Mod: PBBFAC,,, | Performed by: NURSE PRACTITIONER

## 2017-11-15 PROCEDURE — 99499 UNLISTED E&M SERVICE: CPT | Mod: S$GLB,,, | Performed by: NURSE PRACTITIONER

## 2017-11-15 PROCEDURE — 99213 OFFICE O/P EST LOW 20 MIN: CPT | Mod: S$GLB,,, | Performed by: NURSE PRACTITIONER

## 2017-11-15 NOTE — PATIENT INSTRUCTIONS
1.  Mixed urinary incontinence, urge > stress:   --Empty bladder every 3 hours.  Empty well: wait a minute, lean forward on toilet.    --Avoid dietary irritants (see sheet).  Keep diary x 3-5 days to determine your irritants.  --KEGELS: do 10 in AM and 10 in PM, holding each x 10 seconds.  When you feel urge to go, STOP, KEGEL, and when urge has passed, then go to bathroom.  Consider PT in future.    --URGE:continue myrrbetriq to 50 mg daily.  SE profile reviewed.    Takes 2-4 weeks to see if will have effect.      --STRESS:  Pessary vs. Sling vs periurethral injections;does not want to try IC ring     2. Lichens sclerosus:  Itching resolved; much improved   --Clobetasol twice weekly  --Continue to change pads often, use barrier cream twice a day     3. Nocturia (nighttime urination): stop fluids 2 hours before bed.  If have leg swelling:  Elevate feet above chest x 1 hour before bed to get excess fluid off.  Can also use support hose (knee highs).      4. RTC 2 weeks

## 2017-11-15 NOTE — PROGRESS NOTES
Urogyn follow up  11/14/2017  .  [facility]  [address] [id number]    Elisa Richardson  [id number]  7/29/1927      Elisa Richardson is a 89 y.o. here for a urogyn follow up.    Last HPI from 06/02/217  History of Present Illness: 89 Y O F P3 with history of MI one year ago, with worsening urinary incontinence over the past year referred by Dr. Jalloh for evaluation.      HPI 08/02/2017  1)  UI:  (+) MAGGI small amount-- worse if bladder full  (+) UUI    (+) pads:1/day, usually minimum wetness.  Daytime frequency: unsure-- waits for urge  Nocturia: Yes: 1/night.   (--) dysuria,  (--) hematuria,  (--) frequent UTIs.  (+) complete bladder emptying.   2)  POP:  Symptoms:(--)  .  (--) vaginal bleeding. (--) vaginal discharge. (-) sexually active.  (--) dyspareunia.   (--)  Vaginal dryness.  (--) vaginal estrogen use.  Using barrier cream    3)  BM:  (-) constipation/straining.  (--) chronic diarrhea. (--) hematochezia.  (--) fecal incontinence.  (--) fecal smearing/urgency.  (--) incomplete evacuation.            Changes from last visit:  1)  UI:  (+) MAGGI small amount-- worse if bladder full  (+) UUI    (+) depends:6/day, usually moderate wetness.  Daytime frequency: every 4 hours. Nocturia: Yes: 2/night.   (--) dysuria,  (--) hematuria,  (--) frequent UTIs.  (+) complete bladder emptying. Taking myrbetriq 50 mg daily.     2)  POP:  Symptoms:(--)  .  (--) vaginal bleeding. (--) vaginal discharge. (-) sexually active.  (--) dyspareunia.   (--)  Vaginal dryness.  (--) vaginal estrogen use.  Using barrier cream      3)  BM:  (-) constipation/straining.  (--) chronic diarrhea. (--) hematochezia.  (--) fecal incontinence.  (--) fecal smearing/urgency.  (--) incomplete evacuation.             Past Medical History:   Diagnosis Date    Anticoagulant long-term use     Anxiety     Aortic atherosclerosis: see CXR 2012 8/1/2017    Arthritis     Calcified granuloma of lung: see CXR 2012 8/1/2017     Chronic rhinitis     Coronary artery disease involving native coronary artery of native heart without angina pectoris 10/14/2016    Encounter for blood transfusion     Glucose intolerance (impaired glucose tolerance) 2/25/2013    Hyperlipidemia     Hypertension     Osteoporosis     Polyclonal gammopathy     Thyroid disease        Past Surgical History:   Procedure Laterality Date    APPENDECTOMY      EYE SURGERY      HYSTERECTOMY      TONSILLECTOMY         Current Outpatient Prescriptions   Medication Sig    acetaminophen (TYLENOL) 325 MG tablet Take 325 mg by mouth every 6 (six) hours as needed.      ALPRAZolam (XANAX) 0.25 MG tablet TAKE 1 TABLET(0.25 MG) BY MOUTH EVERY NIGHT AS NEEDED FOR ANXIETY    aspirin (ECOTRIN) 81 MG EC tablet Take 1 tablet (81 mg total) by mouth once daily.    atorvastatin (LIPITOR) 40 MG tablet Take 1 tablet (40 mg total) by mouth once daily.    azelastine 0.15 % (205.5 mcg) [place] U 2 SPRAYS IEN BID    cetirizine (ZYRTEC) 10 MG tablet Take 10 mg by mouth daily as needed.      cyanocobalamin (VITAMIN B-12) 1000 MCG tablet Take 1,000 mcg by mouth once daily.    inhalation device (AEROCHAMBER MV) Use as directed for inhalation.    ipratropium (ATROVENT) 0.06 % nasal spray U 2 SPRAYS IEN Q 12 H    levothyroxine (SYNTHROID) 75 MCG tablet TAKE 1 TABLET(75 MCG) BY MOUTH EVERY DAY    lisinopril (PRINIVIL,ZESTRIL) 5 MG tablet Take 1 tablet (5 mg total) by mouth 2 (two) times daily.    mirabegron 50 mg Tb24 Take 1 tablet (50 mg total) by mouth once daily.    multivitamin (THERAGRAN) per tablet Take 1 tablet by mouth once daily.    mupirocin (BACTROBAN) 2 % ointment Apply to affected area 3 times daily    NASACORT AQ 55 mcg nasal inhaler INHALE 1 TO 2 SPRAYS IN EACH NASAL EVERY DAY    omeprazole (PRILOSEC) 40 MG capsule TK 1 C PO QD    vitamin D 1000 units Tab Take 185 mg by mouth once daily.    clobetasol (TEMOVATE) 0.05 % cream Apply topically twice a week.     "metoprolol tartrate (LOPRESSOR) 25 MG tablet Take 1 tablet (25 mg total) by mouth 2 (two) times daily.    nitroGLYCERIN (NITROSTAT) 0.4 MG SL tablet Place 1 tablet (0.4 mg total) under the tongue every 5 (five) minutes as needed for Chest pain.     No current facility-administered medications for this visit.        ROS:  As per HPI.      Exam  BP (!) 160/70 (BP Location: Right arm, Patient Position: Sitting)   Ht 5' 4" (1.626 m)   Wt 83.9 kg (184 lb 15.5 oz)   BMI 31.75 kg/m²   General: alert and oriented, no acute distress  Respiratory: normal respiratory effort  Abd: soft, non-tender, non-distended    Pelvic--deferred    Impression  1. Mixed stress and urge urinary incontinence     2. Vaginal atrophy     3. Lichen sclerosus     4. Nocturia       We reviewed the above issues and discussed options for short-term versus long-term management of her problems.   Plan:     1.  Mixed urinary incontinence, urge > stress:   --Empty bladder every 3 hours.  Empty well: wait a minute, lean forward on toilet.    --Avoid dietary irritants (see sheet).  Keep diary x 3-5 days to determine your irritants.  --KEGELS: do 10 in AM and 10 in PM, holding each x 10 seconds.  When you feel urge to go, STOP, KEGEL, and when urge has passed, then go to bathroom.  --Consider PT in future.    --URGE:continue myrrbetriq to 50 mg daily.  SE profile reviewed.    Takes 2-4 weeks to see if will have effect.      --STRESS:  Pessary vs. Sling vs periurethral injections;does not want to try IC ring     2. Lichens sclerosus:  Itching resolved; much improved   --Clobetasol twice weekly  --Continue to change pads often, use barrier cream twice a day     3. Nocturia (nighttime urination): stop fluids 2 hours before bed.  If have leg swelling:  Elevate feet above chest x 1 hour before bed to get excess fluid off.  Can also use support hose (knee highs).      4. RTC 3 months  30 minutes were spent in face to face time with this patient  75 % of this " time was spent in counseling and/or coordination of care    LARA Nguyen-BC [facility]  Division of Female Pelvic Medicine and Reconstructive Surgery  Department of Obstetrics & Gynecology

## 2017-11-27 ENCOUNTER — TELEPHONE (OUTPATIENT)
Dept: INTERNAL MEDICINE | Facility: CLINIC | Age: 82
End: 2017-11-27

## 2017-11-27 NOTE — TELEPHONE ENCOUNTER
----- Message from Luis E Castelan sent at 11/24/2017 11:35 AM CST -----  Contact: Nick/ Daughter/ [phone] reji(Nohemy)/ [phone] (nick)  Type: Sooner appointment than  is able to schedule    When is the first available appointment? 11/28/2017    What is the nature of the appointment? 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 Jalloh for symptoms she is experiencing after the loss of her spouse.  They would like an appt as soon as possible.  Please call and advise.    Thank you

## 2017-11-28 ENCOUNTER — OFFICE VISIT (OUTPATIENT)
Dept: INTERNAL MEDICINE | Facility: CLINIC | Age: 82
End: 2017-11-28
Payer: MEDICARE

## 2017-11-28 VITALS
SYSTOLIC BLOOD PRESSURE: 142 MMHG | BODY MASS INDEX: 31.24 KG/M2 | HEIGHT: 64 IN | DIASTOLIC BLOOD PRESSURE: 78 MMHG | WEIGHT: 183 LBS | HEART RATE: 76 BPM

## 2017-11-28 DIAGNOSIS — F41.1 GENERALIZED ANXIETY DISORDER: ICD-10-CM

## 2017-11-28 DIAGNOSIS — F43.29 GRIEF REACTION WITH PROLONGED BEREAVEMENT: Primary | ICD-10-CM

## 2017-11-28 DIAGNOSIS — I10 ESSENTIAL HYPERTENSION: ICD-10-CM

## 2017-11-28 PROCEDURE — 99499 UNLISTED E&M SERVICE: CPT | Mod: S$GLB,,, | Performed by: INTERNAL MEDICINE

## 2017-11-28 PROCEDURE — 99999 PR PBB SHADOW E&M-EST. PATIENT-LVL V: CPT | Mod: PBBFAC,,, | Performed by: INTERNAL MEDICINE

## 2017-11-28 PROCEDURE — 99214 OFFICE O/P EST MOD 30 MIN: CPT | Mod: S$GLB,,, | Performed by: INTERNAL MEDICINE

## 2017-11-28 RX ORDER — BUPROPION HYDROCHLORIDE 75 MG/1
TABLET ORAL
Qty: 30 TABLET | Refills: 6 | Status: SHIPPED | OUTPATIENT
Start: 2017-11-28 | End: 2018-01-20

## 2017-11-28 NOTE — Clinical Note
Rosendo Gottlieb:  Severe grief reaction since  ,  Does not want Psych or  or counseling.  I recommended volunteer work, Vood[employer], exercise, Silver Sneakers.  If you could reach out to her it would be great.  I put her on low dose Wellbutrin.  Thanks  Shantel Jalloh

## 2017-11-28 NOTE — PATIENT INSTRUCTIONS
Adjustment Disorder  Life changes -- work, family, parents, children -- each can cause a great deal of stress in life. An adjustment disorder means you have trouble dealing with this change and stress. This problem can have serious results. You may feel helpless, depressed, make bad decisions, or even feel like you want to hurt yourself.  Adjustment disorder can cause anxiety or depression. It is triggered by daily stresses such as:  · Death of a loved one  · Divorce  · Marriage  · General life changes such as changing or leaving a job  · Moving  · Illness or other health issue for you or a family  member  · Sex  · Money     Symptoms may include:  · Sadness, crying  · Anxiety  · Insomnia  · Poor concentration  · Trouble doing simple things  · New problems at work or with family or friends  · Loss of self-esteem  · Sense of hopelessness  · Feeling trapped or cut off from others  With this condition, it is common to feel sad, guilty, hopeless and restless. These feelings may continue for weeks or months. It can be helpful to identify what is causing the additional stress and take steps to get extra support. If new stressful events do not occur, it is likely that you will gradually start feeling better.  Home care  · If you have been given a prescription for medicine, take it as directed.  · It helps to talk about your feelings and thoughts with family or friends that understand and support you.  Follow-up care  Follow up with your healthcare provider, or therapist as advised. Let them know if this condition does not improve or gets worse.  When to seek medical advice  Call your healthcare provider right away if any of these occur:  · Worsening depression or anxiety  · Feeling out of control  · Thoughts of harming yourself or another  · Being unable to care for yourself  Date Last Reviewed: 9/29/2015  © 3963-9554 [employer]. [address] [place] [address], [place] [address]. All rights reserved. This  information is not intended as a substitute for professional medical care. Always follow your healthcare professional's instructions.        Grief and Loss  Losing someone you care about is painful. Grief is the emotional reaction that follows. Its a normal process, with both physical and emotional signs. But even with major life changes, such as the loss of a spouse or parent, you can face the loss and move on.    Grief takes many forms  Each person will have his or her own grieving process. Grief may or may not occur in predictable stages. Or, it may bounce between stages. But, in time, grief will gradually lead you towards acceptance of the loss. Many people are able to continue normal daily activities even with bouts of grief.  Common grief reactions include:  · Not want to believe the loss is real  · Emotional numbness, shock  · Feel annoyed or outright angry  · Think you could have done something to stop the loss  · Have sad moods and feel hopeless  · Loss of appetite, sleep  and loss of interest in things you used to enjoy  Normal grief typically does not need to be treated. Chronic or blunted grief may require treatment with talk therapy. Serious grief reactions or depression related to grief may require treatment. If you think you have grief-related depression, talk to your doctor about whether you need treatment.     Date Last Reviewed: 11/3/2015  © 8930-4649 [employer]. [address] [place] [address], [place] [address]. All rights reserved. This information is not intended as a substitute for professional medical care. Always follow your healthcare professional's instructions.        Grief Reaction  Grief is the feeling that we all have when we lose someone or something that has been important in our life. Grief is an unavoidable and normal reaction to this loss, and can last from months to years. The amount of time depends on different factors. These include how close the person was to you, and  how much support you have through the grief process. Symptoms can be both physical and emotional.  Physical reactions:  · Loss of appetite or overeating  · Changes in weight  · Trouble getting to sleep or staying asleep  · Hair loss  · Upset stomach, indigestion, heart burn, abdominal pain, cramping, diarrhea  · Sense of trouble breathing  · Trembling, shakiness  Emotional reactions:  · Sadness  · Anxiety  · Feeling depressed or helpless  · Difficulty concentrating  · Detachment or withdrawal from those around you  · Loss of interest in your normal life and work  Home care  · Allow yourself to feel the pain of your loss. For some, this can be a key part of healing grief. Talk about your pain with others who understand. Share good memories that involve the person, pet, or possession  you lost.  · Take time for yourself. Make it a point to do things that you enjoy (gardening, walking in nature, going to a movie, etc.).  · Take care of your physical body. Eat a balanced diet (low in saturated fat and high in fruits and vegetables) and establish an exercise plan at least 3 times a week for 30 minutes. Even mild-moderate exercise (like brisk walking) can make you feel better. Get plenty of sleep.  · Avoid the use of alcohol and drugs to cover your emotional pain. This only slows down the emotional healing process.  · Do not isolate yourself from others. Have daily contact with family or friends. Talk about your loss to those closest to you.  · For additional support, meet with your //rabbi, a counselor or therapist, or your own doctor.  · Consider joining a grief support group. Ask your doctor or our staff for information on how to find one in your area.  · If you have been prescribed a medicine to help with your symptoms, take it only as directed. Do not use it with alcohol.  Follow-up care  Follow up with your healthcare provider, or as advised.  Call 911  Call 911 if any of these occur:  · Trouble  breathing  · Very confused  · Very drowsy or trouble awakening  · Fainting or loss of consciousness  · Rapid heart rate  · Seizure  · New chest pain that becomes more severe, lasts longer, or spreads into your shoulder, arm, neck, jaw or back  When to seek medical advice  Call your healthcare provider right away if any of these occur:  · Worsening symptoms  · Unable to eat or sleep for three days in a row  · Feeling extreme depression, fear, anxiety, or anger toward yourself or others  · Feeling out of control  · Feeling that you may try to harm yourself  · family or friends express concern over your behavior and ask you to get help  Date Last Reviewed: 9/29/2015  © 6863-4271 [employer]. [address] [place] [address], [place] [address]. All rights reserved. This information is not intended as a substitute for professional medical care. Always follow your healthcare professional's instructions.        Bupropion tablets (Depression/Mood Disorders)  What is this medicine?  BUPROPION (byoo PROE pee on) is used to treat depression.  How should I use this medicine?  Take this medicine by mouth with a glass of water. Follow the directions on the prescription label. You can take it with or without food. If it upsets your stomach, take it with food. Take your medicine at regular intervals. Do not take your medicine more often than directed. Do not stop taking this medicine suddenly except upon the advice of your doctor. Stopping this medicine too quickly may cause serious side effects or your condition may worsen.  A special MedGuide will be given to you by the pharmacist with each prescription and refill. Be sure to read this information carefully each time.  Talk to your pediatrician regarding the use of this medicine in children. Special care may be needed.  What side effects may I notice from receiving this medicine?  Side effects that you should report to your doctor or health care professional as soon as  possible:  · allergic reactions like skin rash, itching or hives, swelling of the face, lips, or tongue  · breathing problems  · changes in vision  · confusion  · fast or irregular heartbeat  · hallucinations  · increased blood pressure  · redness, blistering, peeling or loosening of the skin, including inside the mouth  · seizures  · suicidal thoughts or other mood changes  · unusually weak or tired  · vomiting  Side effects that usually do not require medical attention (report to your doctor or health care professional if they continue or are bothersome):  · change in sex drive or performance  · constipation  · headache  · loss of appetite  · nausea  · tremors  · weight loss  What may interact with this medicine?  Do not take this medicine with any of the following medications:  · linezolid  · MAOIs like Azilect, Carbex, Eldepryl, Marplan, Nardil, and Parnate  · methylene blue (injected into a vein)  · other medicines that contain bupropion like Zyban  This medicine may also interact with the following medications:  · alcohol  · certain medicines for anxiety or sleep  · certain medicines for blood pressure like metoprolol, propranolol  · certain medicines for depression or psychotic disturbances  · certain medicines for HIV or AIDS like efavirenz, lopinavir, nelfinavir, ritonavir  · certain medicines for irregular heart beat like propafenone, flecainide  · certain medicines for Parkinson's disease like amantadine, levodopa  · certain medicines for seizures like carbamazepine, phenytoin, phenobarbital  · cimetidine  · clopidogrel  · cyclophosphamide  · furazolidone  · isoniazid  · nicotine  · orphenadrine  · procarbazine  · steroid medicines like prednisone or cortisone  · stimulant medicines for attention disorders, weight loss, or to stay awake  · tamoxifen  · theophylline  · thiotepa  · ticlopidine  · tramadol  · warfarin  What if I miss a dose?  If you miss a dose, take it as soon as you can. If it is less than  four hours to your next dose, take only that dose and skip the missed dose. Do not take double or extra doses.  Where should I keep my medicine?  Keep out of the reach of children.  Store at room temperature between 15 and 25 degrees C (59 and 77 degrees F), away from direct sunlight and moisture. Keep tightly closed. Throw away any unused medicine after the expiration date.  What should I tell my health care provider before I take this medicine?  They need to know if you have any of these conditions:  · an eating disorder, such as anorexia or bulimia  · bipolar disorder or psychosis  · diabetes or high blood sugar, treated with medication  · glaucoma  · heart disease, previous heart attack, or irregular heart beat  · head injury or brain tumor  · high blood pressure  · kidney or liver disease  · seizures  · suicidal thoughts or a previous suicide attempt  · Tourette's syndrome  · weight loss  · an unusual or allergic reaction to bupropion, other medicines, foods, dyes, or preservatives  · breast-feeding  · pregnant or trying to become pregnant  What should I watch for while using this medicine?  Tell your doctor if your symptoms do not get better or if they get worse. Visit your doctor or health care professional for regular checks on your progress. Because it may take several weeks to see the full effects of this medicine, it is important to continue your treatment as prescribed by your doctor.  Patients and their families should watch out for new or worsening thoughts of suicide or depression. Also watch out for sudden changes in feelings such as feeling anxious, agitated, panicky, irritable, hostile, aggressive, impulsive, severely restless, overly excited and hyperactive, or not being able to sleep. If this happens, especially at the beginning of treatment or after a change in dose, call your health care professional.  Avoid alcoholic drinks while taking this medicine. Drinking excessive alcoholic beverages,  using sleeping or anxiety medicines, or quickly stopping the use of these agents while taking this medicine may increase your risk for a seizure.  Do not drive or use heavy machinery until you know how this medicine affects you. This medicine can impair your ability to perform these tasks.  Do not take this medicine close to bedtime. It may prevent you from sleeping.  Your mouth may get dry. Chewing sugarless gum or sucking hard candy, and drinking plenty of water may help. Contact your doctor if the problem does not go away or is severe.  NOTE:This sheet is a summary. It may not cover all possible information. If you have questions about this medicine, talk to your doctor, pharmacist, or health care provider. Copyright© 2017 [employer]

## 2017-11-28 NOTE — PROGRESS NOTES
Subjective:       Patient ID: Elisa Richardson is a 89 y.o. female.    Chief Complaint: Depression    UC visit    Still sad with her 's death.  Crying a lot.  Could not tolerate lexapro due to diarrhea- has been on it since July 2016.  Diarrhea is better since stopping it.   She does not feel that this helps.   Not doing anything all day.  Still grieving her 's death in March, doing as well as can be expected.   No further hospice counseling.  Strong family support.  2 daughters live locally, call her every day, see her frequently.  She does not want to be a burden or move in with them.  Long discussion.    Takes xanax 2-3 x weekly; it makes her sleepy.    No SI or HI.    Does not want Psych assessment or social work or counseling.    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     Venous insufficiency (chronic) (peripheral)     Atherosclerosis of arteries of extremities     Left knee DJD     Thyroid nodules: see u/s 12/14; 6/17     Bilateral carotid artery disease: 0-19% bilateral 4/17     Vitamin D deficiency disease     History of non-ST elevation myocardial infarction (NSTEMI)     Generalized anxiety disorder     Mixed hyperlipidemia     Coronary artery disease involving native coronary artery of native heart without angina pectoris     S/P drug eluting coronary stent placement 9/2016     Diastolic dysfunction     Mixed urge and stress incontinence     Lichen sclerosus     Nocturia     Senile purpura     Non morbid obesity due to excess calories     Tortuous aorta     Calcified granuloma of lung: see CXR 2012            Review of Systems   Constitutional: Positive for fatigue. Negative for chills and fever.   HENT: Negative for congestion, ear pain and postnasal drip.     Eyes: Negative.    Respiratory: Negative for cough, chest tightness, shortness of breath and wheezing.    Cardiovascular: Negative.    Gastrointestinal: Negative.    Musculoskeletal: Negative for arthralgias and back pain.   Psychiatric/Behavioral: Positive for dysphoric mood. Negative for agitation, behavioral problems, confusion, self-injury, sleep disturbance and suicidal ideas. The patient is nervous/anxious.        Objective:      Physical Exam   Constitutional: She is oriented to person, place, and time. She appears well-developed and well-nourished.   HENT:   Head: Normocephalic and atraumatic.   Right Ear: External ear normal.   Left Ear: External ear normal.   Mouth/Throat: Oropharynx is clear and moist.   Eyes: Conjunctivae are normal.   Neck: Normal range of motion. Neck supple. Thyromegaly present.   Cardiovascular: Normal rate, regular rhythm and normal heart sounds.    Pulmonary/Chest: No respiratory distress. She has no wheezes.   Musculoskeletal: She exhibits no edema.   Neurological: She is alert and oriented to person, place, and time.   Skin: Skin is warm and dry. No rash noted. No erythema.   Psychiatric: She has a normal mood and affect. Her behavior is normal. Judgment and thought content normal.   Tearful but appropriate, no SI or HI  Able to contract for safety       Assessment:       1. Grief reaction with prolonged bereavement    2. Essential hypertension    3. Generalized anxiety disorder        Plan:         Grief reaction with prolonged bereavement: long discussion.  Activity, Silver Sneakers, churcg, volunteer work, journal.  Consider Psych, she declines    Essential hypertension: continue meds    Generalized anxiety disorder: exercise, sleep hygiene; sparing use anxiolytics    Other orders  -     buPROPion (WELLBUTRIN) 75 MG tablet; 1/2 daily for 2-3 weeks then may increase to 1 tablet daily  Dispense: 30 tablet; Refill: 6.  Cautions, side effects and limitations reviewed.  F/u 2-3  weeks by phone, sooner with problems    Patient counseled for over 50% of her 25 minute appt, all questions answered,  chart reviewed and care coordinated.    Health

## 2017-11-29 ENCOUNTER — TELEPHONE (OUTPATIENT)
Dept: INTERNAL MEDICINE | Facility: CLINIC | Age: 82
End: 2017-11-29

## 2017-11-29 NOTE — TELEPHONE ENCOUNTER
..Called patient left message, intro self, referred by Dr. Jalloh.   Request call back at direct number [phone].  Chery Easley RN HC

## 2017-11-29 NOTE — TELEPHONE ENCOUNTER
----- Message from Shantel Jalloh MD sent at 2017  9:28 PM CST -----  Hi Chery:    Severe grief reaction since  ,  Does not want Psych or  or counseling.  I recommended volunteer work, [employer], exercise, Silver [employer]ne[employer].  If you could reach out to her it would be great.  I put her on low dose Wellbutrin.  Thanks    Shantel Jalloh

## 2017-12-20 ENCOUNTER — TELEPHONE (OUTPATIENT)
Dept: INTERNAL MEDICINE | Facility: CLINIC | Age: 82
End: 2017-12-20

## 2017-12-20 NOTE — TELEPHONE ENCOUNTER
Please call to check on her, to see how she is doing on the Wellbutrin for her grief and depression.  Thank you

## 2018-01-02 ENCOUNTER — PATIENT MESSAGE (OUTPATIENT)
Dept: INTERNAL MEDICINE | Facility: CLINIC | Age: 83
End: 2018-01-02

## 2018-01-11 ENCOUNTER — LAB VISIT (OUTPATIENT)
Dept: LAB | Facility: HOSPITAL | Age: 83
End: 2018-01-11
Attending: INTERNAL MEDICINE
Payer: MEDICARE

## 2018-01-11 DIAGNOSIS — E78.2 MIXED HYPERLIPIDEMIA: ICD-10-CM

## 2018-01-11 DIAGNOSIS — E04.1 THYROID NODULE: ICD-10-CM

## 2018-01-11 DIAGNOSIS — D47.2 MGUS (MONOCLONAL GAMMOPATHY OF UNKNOWN SIGNIFICANCE): ICD-10-CM

## 2018-01-11 LAB
ALBUMIN SERPL BCP-MCNC: 3.6 G/DL
ALP SERPL-CCNC: 85 U/L
ALT SERPL W/O P-5'-P-CCNC: 34 U/L
AST SERPL-CCNC: 57 U/L
BILIRUB DIRECT SERPL-MCNC: 0.3 MG/DL
BILIRUB SERPL-MCNC: 0.7 MG/DL
CHOLEST SERPL-MCNC: 121 MG/DL
CHOLEST/HDLC SERPL: 2.6 {RATIO}
HDLC SERPL-MCNC: 47 MG/DL
HDLC SERPL: 38.8 %
LDLC SERPL CALC-MCNC: 55.2 MG/DL
NONHDLC SERPL-MCNC: 74 MG/DL
PROT SERPL-MCNC: 7 G/DL
T4 FREE SERPL-MCNC: 0.95 NG/DL
TRIGL SERPL-MCNC: 94 MG/DL
TSH SERPL DL<=0.005 MIU/L-ACNC: 4.17 UIU/ML

## 2018-01-11 PROCEDURE — 84165 PROTEIN E-PHORESIS SERUM: CPT

## 2018-01-11 PROCEDURE — 80076 HEPATIC FUNCTION PANEL: CPT

## 2018-01-11 PROCEDURE — 84165 PROTEIN E-PHORESIS SERUM: CPT | Mod: 26,,, | Performed by: PATHOLOGY

## 2018-01-11 PROCEDURE — 84439 ASSAY OF FREE THYROXINE: CPT

## 2018-01-11 PROCEDURE — 36415 COLL VENOUS BLD VENIPUNCTURE: CPT

## 2018-01-11 PROCEDURE — 84443 ASSAY THYROID STIM HORMONE: CPT

## 2018-01-11 PROCEDURE — 80061 LIPID PANEL: CPT

## 2018-01-12 LAB
ALBUMIN SERPL ELPH-MCNC: 3.54 G/DL
ALPHA1 GLOB SERPL ELPH-MCNC: 0.33 G/DL
ALPHA2 GLOB SERPL ELPH-MCNC: 0.88 G/DL
B-GLOBULIN SERPL ELPH-MCNC: 0.87 G/DL
GAMMA GLOB SERPL ELPH-MCNC: 1.18 G/DL
PATHOLOGIST INTERPRETATION SPE: NORMAL
PROT SERPL-MCNC: 6.8 G/DL

## 2018-01-13 ENCOUNTER — PATIENT MESSAGE (OUTPATIENT)
Dept: INTERNAL MEDICINE | Facility: CLINIC | Age: 83
End: 2018-01-13

## 2018-01-13 ENCOUNTER — TELEPHONE (OUTPATIENT)
Dept: INTERNAL MEDICINE | Facility: CLINIC | Age: 83
End: 2018-01-13

## 2018-01-13 NOTE — TELEPHONE ENCOUNTER
Labs are acceptable    If she is very tired, we could increase thyroid dose slightly    How is she doing on the Wellbutrin?

## 2018-01-14 DIAGNOSIS — E03.9 ACQUIRED HYPOTHYROIDISM: ICD-10-CM

## 2018-01-15 RX ORDER — LEVOTHYROXINE SODIUM 75 UG/1
TABLET ORAL
Qty: 90 TABLET | Refills: 0 | Status: SHIPPED | OUTPATIENT
Start: 2018-01-15 | End: 2018-01-20 | Stop reason: SDUPTHER

## 2018-01-15 NOTE — TELEPHONE ENCOUNTER
Spoke to pt and advised. Pt stated that she is not taking Wellbutrin. Pt stated that she has headaches every night that she has to take tylenol for. Also, she is having nosebleeds. Pt requesting to see Dr. Jalloh is week. She declined seeing NP or anyone else.     Thursday PM or Saturday? Ok to work in?

## 2018-01-18 RX ORDER — CLOPIDOGREL BISULFATE 75 MG/1
TABLET ORAL
Qty: 30 TABLET | Refills: 0 | OUTPATIENT
Start: 2018-01-18

## 2018-01-20 ENCOUNTER — OFFICE VISIT (OUTPATIENT)
Dept: INTERNAL MEDICINE | Facility: CLINIC | Age: 83
End: 2018-01-20
Payer: MEDICARE

## 2018-01-20 VITALS
OXYGEN SATURATION: 97 % | BODY MASS INDEX: 31.99 KG/M2 | TEMPERATURE: 98 F | HEIGHT: 64 IN | HEART RATE: 72 BPM | WEIGHT: 187.38 LBS | SYSTOLIC BLOOD PRESSURE: 138 MMHG | DIASTOLIC BLOOD PRESSURE: 70 MMHG

## 2018-01-20 DIAGNOSIS — F41.1 GENERALIZED ANXIETY DISORDER: ICD-10-CM

## 2018-01-20 DIAGNOSIS — D69.2 SENILE PURPURA: ICD-10-CM

## 2018-01-20 DIAGNOSIS — I70.0 ATHEROSCLEROTIC ULCER OF AORTA: Primary | ICD-10-CM

## 2018-01-20 DIAGNOSIS — I71.9 ATHEROSCLEROTIC ULCER OF AORTA: Primary | ICD-10-CM

## 2018-01-20 DIAGNOSIS — I77.9 BILATERAL CAROTID ARTERY DISEASE: ICD-10-CM

## 2018-01-20 DIAGNOSIS — R51.9 NONINTRACTABLE EPISODIC HEADACHE, UNSPECIFIED HEADACHE TYPE: ICD-10-CM

## 2018-01-20 DIAGNOSIS — E03.9 ACQUIRED HYPOTHYROIDISM: ICD-10-CM

## 2018-01-20 DIAGNOSIS — M47.812 CERVICAL SPINE ARTHRITIS: ICD-10-CM

## 2018-01-20 DIAGNOSIS — J84.10 CALCIFIED GRANULOMA OF LUNG: ICD-10-CM

## 2018-01-20 DIAGNOSIS — I25.10 CORONARY ARTERY DISEASE INVOLVING NATIVE CORONARY ARTERY OF NATIVE HEART WITHOUT ANGINA PECTORIS: ICD-10-CM

## 2018-01-20 PROCEDURE — 99214 OFFICE O/P EST MOD 30 MIN: CPT | Mod: S$GLB,,, | Performed by: INTERNAL MEDICINE

## 2018-01-20 PROCEDURE — 99499 UNLISTED E&M SERVICE: CPT | Mod: S$GLB,,, | Performed by: INTERNAL MEDICINE

## 2018-01-20 PROCEDURE — 99999 PR PBB SHADOW E&M-EST. PATIENT-LVL IV: CPT | Mod: PBBFAC,,, | Performed by: INTERNAL MEDICINE

## 2018-01-20 RX ORDER — TIZANIDINE 2 MG/1
4 TABLET ORAL EVERY 8 HOURS PRN
Qty: 30 TABLET | Refills: 0 | Status: SHIPPED | OUTPATIENT
Start: 2018-01-20 | End: 2018-01-30

## 2018-01-20 RX ORDER — FUROSEMIDE 20 MG/1
TABLET ORAL
COMMUNITY
Start: 2018-01-18 | End: 2018-04-19 | Stop reason: SDUPTHER

## 2018-01-20 RX ORDER — LEVOTHYROXINE SODIUM 88 UG/1
88 TABLET ORAL
Qty: 90 TABLET | Refills: 3 | Status: SHIPPED | OUTPATIENT
Start: 2018-01-20

## 2018-01-20 RX ORDER — DOXYCYCLINE 100 MG/1
100 CAPSULE ORAL 2 TIMES DAILY
Qty: 14 CAPSULE | Refills: 0 | Status: SHIPPED | OUTPATIENT
Start: 2018-01-20

## 2018-01-20 NOTE — Clinical Note
Hi all: [place] patient you saw in August.  CAD s/p stenting.  Atherosclerotic ulcer of aorta: see CT 10/17.  On aspirin and statin, BP doing well.  I am sending her to CTS;  does she need Plavix again?  Thanks-  Shantel Jalloh

## 2018-01-20 NOTE — PROGRESS NOTES
"Subjective:       Patient ID: Elisa Richardson is a 89 y.o. female.    Chief Complaint: Headache    Multiple issues    Headache    Recall last visit she was still sad with her 's death, crying a lot.  Could not tolerate lexapro due to diarrhea- has been on it since July 2016.  Diarrhea got better afterstopping it.   She did not feel that it helped.   "Not doing anything all day.  Still grieving her 's death in March 2017, doing as well as can be expected.   No further hospice counseling.  Strong family support.  2 daughters live locally, call her every day, see her frequently.  She does not want to be a burden or move in with them.  Long discussion."  Tried wellbutrin.  Tried to follow up and she did not return phone calls.  Says it did not work.     Takes xanax 2-3 x weekly; it works the best for her.  Pattern of use stable.  No SI or HI.  Still does not want Psych assessment or social work or counseling.    Pain from back of neck and radiates up to head.  Gets headaches at the end of the day.  Tylenol helps.    Thyroid slightly underactive.  Discussed increasing dose    CT chest showed an atherosclerotic ulcer- buried in the body of the report.  Discussed.  Will get CTS.   She is on aspirin daily as well as Lipitor and lipids at target range.  No chest pain, pressure, tightness or SOB.  No L arm pain or syncope.  No upper back pain.    General Cardiology saw her in August 2017: "CAD is treated with low dose ASA and high intensity statin.  She is taking dual anti-platelet therapy since her stent placement in 9/2016.  Denies bleeding gums, epistaxis, hematuria, and hematochezia.  Her DAPT score is 0 and she will be 1 year post-THONY on 9/23/17.  On 9/23/16, she had a THONY to the distal RCA and angioplasty of the ostial PDA and the proximal PLB.  Stents were not placed in the PDA or PLB to prevent stent jailing.  Hyperlipidemia is treated with atorvastatin and LDL is 56.  Hypertension is treated with " "lisinopril 5mg and metoprolol succinate 25mg.  Reports following a low salt diet. Home blood pressure readings are 130s/70-80s."   Plavix stopped then.    Some URI sx, yellowish mucus, now clear.  No fever.  No facial cough.  Slight cough.  White mucus.   No chest pain.      BP slightly high; she did not take meds today.  At home readings 120-130/70-80 range.        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     Venous insufficiency (chronic) (peripheral)     Atherosclerosis of arteries of extremities     Left knee DJD     Thyroid nodules: see u/s 12/14; 6/17     Bilateral carotid artery disease: 0-19% bilateral 4/17     Vitamin D deficiency disease     History of non-ST elevation myocardial infarction (NSTEMI)     Generalized anxiety disorder     Mixed hyperlipidemia     Coronary artery disease involving native coronary artery of native heart without angina pectoris     S/P drug eluting coronary stent placement 9/2016     Diastolic dysfunction     Mixed urge and stress incontinence     Lichen sclerosus     Nocturia     Senile purpura     Non morbid obesity due to excess calories     Tortuous aorta     Calcified granuloma of lung: see CXR 2012           Headache    Associated symptoms include coughing, neck pain, rhinorrhea and sinus pressure. Pertinent negatives include no ear pain or fever.     Review of Systems   Constitutional: Positive for fatigue. Negative for chills and fever.   HENT: Positive for congestion, rhinorrhea, sinus pain and sinus pressure. Negative for ear pain and postnasal drip.    Eyes: Negative.    Respiratory: Positive for cough. Negative for chest tightness, shortness of breath and wheezing.    Cardiovascular: Negative.         Varicose veins bother her L.> R "   Gastrointestinal: Negative.    Genitourinary: Negative for hematuria.   Musculoskeletal: Positive for arthralgias and neck pain.   Neurological: Positive for headaches.   Hematological:        Purpura- senile- has had for at least a decade.  No epistaxis or other bleeding issues   Psychiatric/Behavioral: Positive for dysphoric mood. The patient is nervous/anxious.         Did not like wellbutrin  Feels xanax works the best- takes infrequently       Objective:      Physical Exam   Constitutional: She is oriented to person, place, and time. She appears well-developed and well-nourished.   HENT:   Head: Normocephalic and atraumatic.   Right Ear: External ear normal.   Left Ear: External ear normal.   Mouth/Throat: Oropharynx is clear and moist.   Eyes: Conjunctivae are normal.   Neck: Normal range of motion. Neck supple. Thyromegaly present.   No bruits   Cardiovascular: Normal rate, regular rhythm and normal heart sounds.    Pulmonary/Chest: No respiratory distress. She has no wheezes. She has no rales.   Abdominal: Soft. Bowel sounds are normal.   Musculoskeletal: She exhibits no edema.   Varicose veins bilaterally  No cords or Jai's signs  No cellulitis    Trapezius spasm over both sides   Lymphadenopathy:     She has no cervical adenopathy.   Neurological: She is alert and oriented to person, place, and time.   Skin: Skin is warm and dry. No rash noted. No erythema.   A few areas of senile purpura   Psychiatric: She has a normal mood and affect. Her behavior is normal. Judgment and thought content normal.       Assessment:       1. Atherosclerotic ulcer of aorta: see CT 10/17    2. Nonintractable episodic headache, unspecified headache type    3. Acquired hypothyroidism    4. Senile purpura    5. Calcified granuloma of lung: see CXR 2012    6. Bilateral carotid artery disease: 0-19% bilateral 4/17    7. Coronary artery disease involving native coronary artery of native heart without angina pectoris    8.  Generalized anxiety disorder    9. Cervical spine arthritis: see CT 12/14        Plan:         Elisa was seen today for headache.    Diagnoses and all orders for this visit:    Atherosclerotic ulcer of aorta: see CT 10/17: discussed.  On aspirin and statin, BP doing well.  Chart to Cardiology- does she need Plavix again?  -     Ambulatory consult to Cardiothoracic Surgery    Nonintractable episodic headache, unspecified headache type: appears to be muscle contraction; neck is tight.  Does not start in head; starts in neck after a long day and goes up    Acquired hypothyroidism: tired.  TSH elevated; will increase dose and monitor  -     levothyroxine (SYNTHROID) 88 MCG tablet; Take 1 tablet (88 mcg total) by mouth before breakfast.  -     TSH; Future    Senile purpura: no alarm sx    Calcified granuloma of lung: see CXR 2012: reviewed.  No fevers or weight loss    Bilateral carotid artery disease: 0-19% bilateral 4/17: reviewed, will monitor    Coronary artery disease involving native coronary artery of native heart without angina pectoris: no current sx; due for Cardiology follow up    Generalized anxiety disorder: stable on regimen    Cervical spine arthritis: see CT 12/14: tylenol; heating pads; trial of low dose muscle relaxants; follow up[ poor results    Other orders  -     tiZANidine (ZANAFLEX) 2 MG tablet; Take 2 tablets (4 mg total) by mouth every 8 (eight) hours as needed.  -     doxycycline (MONODOX) 100 MG capsule; Take 1 capsule (100 mg total) by mouth 2 (two) times daily.    Rest, fluids, acetaminophen, mucinex and follow up poor results.    I will review all studies and determine further tx depending on findings

## 2018-01-20 NOTE — PATIENT INSTRUCTIONS
Nonsurgical Treatment of Cervical Spine Problems  Cervical spine problems can often be treated without surgery. Choices include rest, medicines, or injections. Your healthcare provider may also suggest physical therapy or certain exercises. These may all help to relieve your symptoms. These treatments are often successful. But if your symptoms dont subside, talk to your healthcare provider. He or she may tell you that surgery is your best choice.  Relieving your symptoms  Your healthcare provider may recommend:  · Medicines. These help to reduce pain and inflammation.  · Epidural steroid injections. These are injections into the spinal canal near the spinal cord. They may relieve severe pain and reduce inflammation.  · Restricting aggravating activities. This can help to give your cervical spine time to heal.  · A soft cervical collar. This can help to support your head while keeping your cervical spine aligned.  · Traction. This may help relieve pressure on the irritated nerves.  Restoring mobility and strength  Your healthcare provider may recommend that you work with a physical therapist. This can help you regain mobility and strength in your neck. Physical therapy may last for 4 to 8 weeks. It may include:  · Exercises to improve your necks range of motion and strength.  · Evaluation and correction of posture and body movements. This can correct problems that affect your cervical spine.  · Heat, massage, and traction to help relieve your symptoms.  Self-care  Youll take an active role in your own therapy. To protect your neck from further injury:  · Follow any exercise program given to you by your healthcare provider or physical therapist.  · Practice good posture while sitting, standing, or moving.  · Have your workspace evaluated. Rearrange it if needed.  · When lying down, support your neck. You can use a special cervical pillow or rolled-up towel.   Date Last Reviewed: 10/5/2015  © 4948-5962 [employer]. [address] [place] [address], [place] [address]. All rights reserved. This information is not intended as a substitute for professional medical care. Always follow your healthcare professional's instructions.        Superficial Thrombophlebitis   The superficial veins are the veins near the surface of the skin. Superficial thrombophlebitis is a problem that occurs when one or more of these veins become inflamed (red, irritated, and swollen). This is most often because of a blood clot.  Causes  The problem may occur after injury to a vein. It may also occur after having an intravenous (IV) line placed. Other factors that can make the problem more likely include:  · Varicose veins  · Venous insufficiency  · Bleeding disorders  · Prolonged periods of rest and not moving around  · IV drug abuse  Symptoms  Symptoms may appear in the affected area. They can include:  · Pain  · Tenderness  · Redness  · Warmth  · Swelling  · Hardening of the vein  In most cases, superficial thrombophlebitis resolves on its own with no problems. Treatment is focused on relieving symptoms.  Sometimes, there is a risk that the deep veins in the body may also be involved. This can lead to more serious problems. In such cases, further testing and treatments may be needed. Your healthcare provider can tell you more about this.  Home Care  To help relieve pain and swelling, you may be told to:  · Apply heat or cold to the affected area. Do this for up to 10 minutes as often as directed.  ¨ Heat: Use a warm compress, such as a heating pad.  ¨ Cold: Use a cold compress, such as a cold pack or bag of ice wrapped in a thin towel.  · Take nonsteroidal anti-inflammatory drugs (NSAIDS), such as ibuprofen. In some cases, other pain medicines may be prescribed.  · Keep the affected limb (arm or leg) raised above heart level as directed.  · Wear elastic compression stockings or bandages as directed.  · Avoid prolonged sitting or standing.  Get up and walk often.  To help treat a blood clot, a blood thinner (anticoagulant) may be prescribed. If this is needed, be sure to take the medicine exactly as directed.  Follow-up care  Follow up with your healthcare provider as advised. If imaging tests are done, they will be reviewed by a doctor. Youll be told the results and any new findings that may affect your treatment.  When to seek medical advice  Call your healthcare provider right away if any of these occur:  · Fever of 100.4°F (38ºC) or higher, or as directed by your provider  · Increasing pain, swelling, or tenderness in the affected area  · Spreading warmth or redness in the affected area  Call 911  Call 911 right away if any of these occur:  · Trouble breathing  · Chest pain or discomfort that worsens with deep breathing or coughing  · Coughing (may cough up blood)  · Fast or irregular heartbeat  · Sweating  · Anxiety  · Lightheadedness, dizziness, or fainting  · Extreme confusion  · Extreme drowsiness or trouble waking up  · New pain in the chest, arm, shoulder, neck, or upper back  Date Last Reviewed: 9/21/2015  © 2804-3840 [employer]. [address] [place] [address], [place] [address]. All rights reserved. This information is not intended as a substitute for professional medical care. Always follow your healthcare professional's instructions.

## 2018-01-22 ENCOUNTER — TELEPHONE (OUTPATIENT)
Dept: INTERNAL MEDICINE | Facility: CLINIC | Age: 83
End: 2018-01-22

## 2018-01-22 DIAGNOSIS — E03.9 ACQUIRED HYPOTHYROIDISM: Primary | ICD-10-CM

## 2018-01-23 NOTE — TELEPHONE ENCOUNTER
She needs a TSH in 3 months orders in please schedule and let me know    (She was not supposed to have lab work done on the weekend) thanks

## 2018-01-23 NOTE — TELEPHONE ENCOUNTER
----- Message from Mary Bonds NP sent at 1/23/2018 10:20 AM CST -----  Hi Dr. Jalloh,  I'm sorry for the delay.  I wasn't confident of the answer so I wanted to double check with the cardiologist.  I spoke with Dr. Hancock and he confirmed.  She had her last stent in 2016 so she should only need high intensity statin therapy (goal LDL<70) and low dose ASA for the new finding of atherosclerotic ulcer in the aorta.  I appreciate you checking in with me!  Thank you,  Mary    ----- Message -----  From: Shantel Jalloh MD  Sent: 1/20/2018  11:36 AM  To: Mary Bonds NP    Hi all:  [place] patient you saw in August.  CAD s/p stenting.  Atherosclerotic ulcer of aorta: see CT 10/17.  On aspirin and statin, BP doing well.  I am sending her to CTS;  does she need Plavix again?    Thanks-    Shantel Jalloh

## 2018-01-31 ENCOUNTER — PES CALL (OUTPATIENT)
Dept: ADMINISTRATIVE | Facility: CLINIC | Age: 83
End: 2018-01-31

## 2018-03-06 DIAGNOSIS — F41.1 GENERALIZED ANXIETY DISORDER: ICD-10-CM

## 2018-03-07 RX ORDER — ALPRAZOLAM 0.25 MG/1
TABLET ORAL
Qty: 30 TABLET | Refills: 0 | Status: SHIPPED | OUTPATIENT
Start: 2018-03-07 | End: 2018-05-04 | Stop reason: SDUPTHER

## 2018-03-08 RX ORDER — LISINOPRIL 5 MG/1
5 TABLET ORAL 2 TIMES DAILY
Qty: 180 TABLET | Refills: 3 | Status: SHIPPED | OUTPATIENT
Start: 2018-03-08 | End: 2019-03-08

## 2018-03-08 RX ORDER — METOPROLOL TARTRATE 25 MG/1
TABLET, FILM COATED ORAL
Qty: 60 TABLET | Refills: 0 | OUTPATIENT
Start: 2018-03-08

## 2018-03-09 ENCOUNTER — TELEPHONE (OUTPATIENT)
Dept: RESEARCH | Facility: HOSPITAL | Age: 83
End: 2018-03-09

## 2018-03-12 RX ORDER — METOPROLOL TARTRATE 25 MG/1
25 TABLET, FILM COATED ORAL 2 TIMES DAILY
Qty: 60 TABLET | Refills: 11 | Status: SHIPPED | OUTPATIENT
Start: 2018-03-12 | End: 2019-03-12

## 2018-03-12 NOTE — TELEPHONE ENCOUNTER
"----- Message from Leti Thee sent at 3/12/2018  1:58 PM CDT -----  Contact: call son in law [place]ello [phone]  RX request - refill or new RX.  Is this a refill or new RX:  Refill   RX name and strength: metoprolol tartrate (LOPRESSOR) 25 MG tablet ()  Directions:   Is this a 30 day or 90 day RX: 90  day  Pharmacy name and phone # (DON'T enter "on file" or "in chart"): [facility] [address] [place], [place] - [address] [facility] [phone] (Phone)   Comments:        "

## 2018-04-23 DIAGNOSIS — I25.10 CORONARY ARTERY DISEASE INVOLVING NATIVE CORONARY ARTERY OF NATIVE HEART WITHOUT ANGINA PECTORIS: Primary | ICD-10-CM

## 2018-04-23 RX ORDER — FUROSEMIDE 20 MG/1
20 TABLET ORAL DAILY
Qty: 30 TABLET | Refills: 0 | Status: SHIPPED | OUTPATIENT
Start: 2018-04-23 | End: 2018-07-21 | Stop reason: SDUPTHER

## 2018-04-24 ENCOUNTER — TELEPHONE (OUTPATIENT)
Dept: CARDIOLOGY | Facility: CLINIC | Age: 83
End: 2018-04-24

## 2018-05-02 ENCOUNTER — LAB VISIT (OUTPATIENT)
Dept: LAB | Facility: HOSPITAL | Age: 83
End: 2018-05-02
Attending: INTERNAL MEDICINE
Payer: MEDICARE

## 2018-05-02 ENCOUNTER — PATIENT MESSAGE (OUTPATIENT)
Dept: INTERNAL MEDICINE | Facility: CLINIC | Age: 83
End: 2018-05-02

## 2018-05-02 DIAGNOSIS — E03.9 ACQUIRED HYPOTHYROIDISM: ICD-10-CM

## 2018-05-02 LAB — TSH SERPL DL<=0.005 MIU/L-ACNC: 3.01 UIU/ML

## 2018-05-02 PROCEDURE — 84443 ASSAY THYROID STIM HORMONE: CPT

## 2018-05-02 PROCEDURE — 36415 COLL VENOUS BLD VENIPUNCTURE: CPT

## 2018-05-04 DIAGNOSIS — F41.1 GENERALIZED ANXIETY DISORDER: ICD-10-CM

## 2018-05-06 RX ORDER — ALPRAZOLAM 0.25 MG/1
TABLET ORAL
Qty: 30 TABLET | Refills: 0 | Status: SHIPPED | OUTPATIENT
Start: 2018-05-06

## 2018-06-14 DIAGNOSIS — R39.15 URINARY URGENCY: ICD-10-CM

## 2018-06-19 DIAGNOSIS — R39.15 URINARY URGENCY: ICD-10-CM

## 2018-07-22 RX ORDER — FUROSEMIDE 20 MG/1
TABLET ORAL
Qty: 30 TABLET | Refills: 0 | Status: SHIPPED | OUTPATIENT
Start: 2018-07-22 | End: 2018-08-18 | Stop reason: SDUPTHER

## 2018-08-21 ENCOUNTER — TELEPHONE (OUTPATIENT)
Dept: CARDIOLOGY | Facility: CLINIC | Age: 83
End: 2018-08-21

## 2018-08-21 RX ORDER — FUROSEMIDE 20 MG/1
TABLET ORAL
Qty: 30 TABLET | Refills: 0 | Status: SHIPPED | OUTPATIENT
Start: 2018-08-21

## 2018-08-21 NOTE — TELEPHONE ENCOUNTER
Call to make an appointment and she is no longer coming to [facility].  I told her daughter they must get the refill from the other doctor.

## 2018-08-21 NOTE — TELEPHONE ENCOUNTER
----- Message from Mary Bonds NP sent at 8/21/2018 10:45 AM CDT -----  Regarding: Needs appointment  Hi Rosa Isela,  She needs an appointment ASAP with labs before.  I gave her a 30 day prescription for her furosemide but I can't give her more refills safely without labs and a f/u.  Thank you,  Mary

## 2019-03-22 ENCOUNTER — DOCUMENTATION ONLY (OUTPATIENT)
Dept: AUDIOLOGY | Facility: CLINIC | Age: 84
End: 2019-03-22

## 2019-03-22 NOTE — PROGRESS NOTES
No Contract on file -  Husbands old hearing aids    [employer] Dream 440 Fusion, Winter Silver  Purchase date 13  Lt SN [id number]  Rt SN [id number]

## 2020-10-05 ENCOUNTER — PATIENT MESSAGE (OUTPATIENT)
Dept: ADMINISTRATIVE | Facility: HOSPITAL | Age: 85
End: 2020-10-05

## 2021-01-04 ENCOUNTER — PATIENT MESSAGE (OUTPATIENT)
Dept: ADMINISTRATIVE | Facility: HOSPITAL | Age: 86
End: 2021-01-04